# Patient Record
Sex: FEMALE | Race: BLACK OR AFRICAN AMERICAN | NOT HISPANIC OR LATINO | Employment: FULL TIME | URBAN - METROPOLITAN AREA
[De-identification: names, ages, dates, MRNs, and addresses within clinical notes are randomized per-mention and may not be internally consistent; named-entity substitution may affect disease eponyms.]

---

## 2017-01-23 ENCOUNTER — PROCEDURE VISIT (OUTPATIENT)
Dept: NEUROLOGY | Facility: CLINIC | Age: 57
End: 2017-01-23
Payer: COMMERCIAL

## 2017-01-23 DIAGNOSIS — R20.2 NUMBNESS AND TINGLING: ICD-10-CM

## 2017-01-23 DIAGNOSIS — R20.0 NUMBNESS AND TINGLING: ICD-10-CM

## 2017-01-23 DIAGNOSIS — G56.03 BILATERAL CARPAL TUNNEL SYNDROME: ICD-10-CM

## 2017-01-23 PROCEDURE — 95910 NRV CNDJ TEST 7-8 STUDIES: CPT | Mod: S$GLB,,, | Performed by: PSYCHIATRY & NEUROLOGY

## 2017-01-23 NOTE — PATIENT INSTRUCTIONS
PERIPHERAL NEUROPATHY  Peripheral Neuropathy is a condition that affects the nerves of the arms or legs. It causes a change in physical feeling. Sometimes it causes weakness in the muscles. You may feel tingling, numbness or shooting pains (especially at night). You may be sensitive to light touch or temperature changes.  Neuropathy may be caused by being exposed to certain drugs or chemicals, or because of a vitamin deficiency. It can be a complication of a chronic disease such as diabetes or alcoholism. A ruptured disk with pressure on the spinal nerve may also cause this condition.  HOME CARE:  1) You may take acetaminophen (Tylenol) or ibuprofen (Advil, Motrin) for pain, unless another pain medicine has been prescribed.  2) If the neuropathy affects your feet, keep your toenails trimmed and wash your feet often. Wear shoes that fit well. Doing so avoids pressure points, blisters and ulcers. Due to a loss of feeling, you may not notice injuries, so look at your feet carefully (including the soles of your feet and between your toes) at least once a week. Tell your doctor if you have any open wounds or signs of infection.  3) If vitamins have been prescribed, be sure to remind yourself to take them daily.  FOLLOW UP with your doctor or as advised by our staff. You may need further testing to find out the exact cause of your neuropathy.  GET PROMPT MEDICAL ATTENTION if any of the following occur:  -- Redness, swelling or pus coming from the toes or feet  -- Loss of bowel or bladder control (if this is a new symptom for you)  -- Muscle weakness (if this is a new symptom for you)  © 2781-6418 [employer], [address] [place] [address], [place] [address]. All rights reserved. This information is not intended as a substitute for professional medical care. Always follow your healthcare professional's instructions.

## 2017-03-14 ENCOUNTER — OFFICE VISIT (OUTPATIENT)
Dept: INTERNAL MEDICINE | Facility: CLINIC | Age: 57
End: 2017-03-14
Payer: COMMERCIAL

## 2017-03-14 VITALS
SYSTOLIC BLOOD PRESSURE: 120 MMHG | TEMPERATURE: 98 F | OXYGEN SATURATION: 99 % | DIASTOLIC BLOOD PRESSURE: 78 MMHG | BODY MASS INDEX: 25.47 KG/M2 | HEIGHT: 67 IN | WEIGHT: 162.25 LBS | HEART RATE: 71 BPM

## 2017-03-14 DIAGNOSIS — D70.9 NEUTROPENIA, UNSPECIFIED TYPE: ICD-10-CM

## 2017-03-14 DIAGNOSIS — Z29.9 PREVENTIVE MEASURE: ICD-10-CM

## 2017-03-14 DIAGNOSIS — R76.8 ANTINUCLEAR FACTOR POSITIVE: ICD-10-CM

## 2017-03-14 DIAGNOSIS — R22.1 NECK SWELLING: Primary | ICD-10-CM

## 2017-03-14 DIAGNOSIS — E03.9 HYPOTHYROIDISM (ACQUIRED): ICD-10-CM

## 2017-03-14 DIAGNOSIS — I10 ESSENTIAL HYPERTENSION: ICD-10-CM

## 2017-03-14 PROCEDURE — 3074F SYST BP LT 130 MM HG: CPT | Mod: S$GLB,,, | Performed by: FAMILY MEDICINE

## 2017-03-14 PROCEDURE — 1160F RVW MEDS BY RX/DR IN RCRD: CPT | Mod: S$GLB,,, | Performed by: FAMILY MEDICINE

## 2017-03-14 PROCEDURE — 99214 OFFICE O/P EST MOD 30 MIN: CPT | Mod: S$GLB,,, | Performed by: FAMILY MEDICINE

## 2017-03-14 PROCEDURE — 99999 PR PBB SHADOW E&M-EST. PATIENT-LVL III: CPT | Mod: PBBFAC,,, | Performed by: FAMILY MEDICINE

## 2017-03-14 PROCEDURE — 3078F DIAST BP <80 MM HG: CPT | Mod: S$GLB,,, | Performed by: FAMILY MEDICINE

## 2017-03-14 NOTE — PROGRESS NOTES
Subjective:       Patient ID: Gaviota Frazier is a 57 y.o. female.    Chief Complaint: Lymphadenopathy    HPI Comments: 57-year-old female patient with Patient  Active Problem List:     HTN (hypertension)     GERD (gastroesophageal reflux disease)     Hypothyroidism (acquired)     Antinuclear factor positive     Neutropenia  Concerned about swollen glands in the right side of the neck, denies of any discomfort with swallowing, fatigue chest pain or shortness of breath.  Patient has stopped taking thyroid medication for the past 1 month, previously had seen Dr. Espinoza for numbness and tingling sensation to her hands and discoloration, for which she was started on amlodipine, patient has discontinued that and was in turn refer to endocrinologist Dr. Caballero, had complete workup done and was ruled out Raynauds,  was advised to follow-up with dermatology and neurology  Patient reports that the discoloration of hands has subsided and tingling and numbness has subsided after stopping thyroid medication.   Patient after seen neurologist had EMG studies showing bilateral sensory neuropathy, was advised to repeat testing in 6 months if symptoms continue to persist.   Patient had positive CHUYITA levels for which she's been refer to rheumatology.  Patient reports that she thinks her thyroid medication has caused all her symptoms, and since stopping her medication she's been feeling better.  Patient continues to take carvedilol for blood pressure  Reports noticing swollen glands for the past 1 month.  Denies of any fever              Review of Systems   Constitutional: Negative for fatigue and fever.   HENT: Negative for postnasal drip and trouble swallowing.    Eyes: Negative for visual disturbance.   Respiratory: Negative for shortness of breath.    Cardiovascular: Negative for chest pain.   Gastrointestinal: Negative for abdominal pain, nausea and vomiting.   Musculoskeletal: Negative for arthralgias and myalgias.   Skin:  "Negative for rash.   Neurological: Negative for weakness, light-headedness, numbness and headaches.   Psychiatric/Behavioral: Negative for sleep disturbance.         /78  Pulse 71  Temp 98 °F (36.7 °C) (Tympanic)   Ht 5' 7" (1.702 m)  Wt 73.6 kg (162 lb 4.1 oz)  SpO2 99%  BMI 25.41 kg/m2  Objective:      Physical Exam   Constitutional: She is oriented to person, place, and time. She appears well-developed and well-nourished.   HENT:   Head: Normocephalic and atraumatic.   Right Ear: External ear normal.   Left Ear: External ear normal.   Mouth/Throat: Oropharynx is clear and moist.   Neck: Neck supple. Thyromegaly present.   Cardiovascular: Normal rate, regular rhythm and normal heart sounds.    Pulmonary/Chest: Effort normal and breath sounds normal. She has no wheezes.   Abdominal: Soft. Bowel sounds are normal. There is no tenderness.   Lymphadenopathy:     She has cervical adenopathy.   Neurological: She is alert and oriented to person, place, and time.   Skin: Skin is warm and dry. No rash noted.   Psychiatric: She has a normal mood and affect.         Assessment:       1. Neck swelling    2. Preventive measure    3. Hypothyroidism (acquired)    4. Essential hypertension    5. Antinuclear factor positive    6. Neutropenia, unspecified type        Plan:   Neck swelling  -     US Soft Tissue Head Neck Thyroid; Future; Expected date: 3/14/17  Will check ultrasound, as previous ultrasound showing thyroid nodules.     Will continue to monitor, advised to take over-the-counter Tylenol/ibuprofen as needed, if having any pain.   Follow-up in 2 weeks    Preventive measure  -     CBC auto differential; Future; Expected date: 3/14/17  -     Comprehensive metabolic panel; Future; Expected date: 3/14/17  -     Lipid panel; Future; Expected date: 3/14/17  -     TSH; Future; Expected date: 3/14/17  Will check further labs and follow up in 2 weeks    Hypothyroidism (acquired)  -     TSH; Future; Expected date: " 3/14/17  -     T4; Future; Expected date: 3/14/17  -     T3; Future; Expected date: 3/14/17  -     US Soft Tissue Head Neck Thyroid; Future; Expected date: 3/14/17  Currently not taking levothyroxine 100 µg by mouth daily    Essential hypertension  -     Comprehensive metabolic panel; Future; Expected date: 3/14/17  -     Lipid panel; Future; Expected date: 3/14/17  Blood pressure stable today currently on carvedilol 12.5 mg twice daily    Antinuclear factor positive- followed by rheumatology Dr. Federico Rankin, unspecified type  -     CBC auto differential; Future; Expected date: 3/14/17  Will recheck labs today

## 2017-03-14 NOTE — MR AVS SNAPSHOT
[facility]  [address] [place] [address]  Phone: [phone]  Fax: [phone]                  Gaviota Frazier   3/14/2017 4:00 PM   Office Visit    Description:  Female : 1960   Provider:  Princess Malave MD   Department:  [facility]           Reason for Visit     Lymphadenopathy           Diagnoses this Visit        Comments    Neck swelling    -  Primary     Preventive measure         Hypothyroidism (acquired)         Essential hypertension         Antinuclear factor positive         Neutropenia, unspecified type                To Do List           Future Appointments        Provider Department Dept Phone    3/17/2017 10:25 AM LAB[facility]AT[facility], [facility] [phone]    3/17/2017 11:15 AM [facility] [phone]    3/28/2017 9:00 AM Princess Malave MD [facility] [phone]      Goals (5 Years of Data)     None      Follow-Up and Disposition     Return in about 2 weeks (around 3/28/2017), or if symptoms worsen or fail to improve.      [facility]     [facility] Care [facility] -  Assistance  Registered nurses in the [facility] provide clinical advisement, health education, appointment booking, and other advisory services.  Call for this free service at [phone].             Medications           Message regarding Medications     Verify the changes and/or additions to your medication regime listed below are the same as discussed with your clinician today.  If any of these changes or additions are incorrect, please notify your healthcare provider.        STOP taking these medications     betamethasone dipropionate (DIPROLENE) 0.05 % ointment Apply topically 2 (two) times daily. Use with cotton gloves at bedtime    amlodipine (NORVASC) 5 MG tablet Take 1 tablet (5 mg total) by mouth once daily.    ergocalciferol (ERGOCALCIFEROL) 50,000 unit Cap Take 1 capsule (50,000  "Units total) by mouth every 7 days.    estradiol (ESTRACE) 2 MG tablet Take 2 mg by mouth once daily.           Verify that the below list of medications is an accurate representation of the medications you are currently taking.  If none reported, the list may be blank. If incorrect, please contact your healthcare provider. Carry this list with you in case of emergency.           Current Medications     carvedilol (COREG) 12.5 MG tablet Take 1 tablet (12.5 mg total) by mouth 2 (two) times daily with meals.    levothyroxine (SYNTHROID) 100 MCG tablet Take 1 tablet (100 mcg total) by mouth once daily.           Clinical Reference Information           Your Vitals Were     BP Pulse Temp Height Weight SpO2    120/78 71 98 °F (36.7 °C) (Tympanic) 5' 7" (1.702 m) 73.6 kg (162 lb 4.1 oz) 99%    BMI                25.41 kg/m2          Blood Pressure          Most Recent Value    BP  120/78      Allergies as of 3/14/2017     Beni    Unable To Assess      Immunizations Administered on Date of Encounter - 3/14/2017     None      Orders Placed During Today's Visit     Future Labs/Procedures Expected by Expires    CBC auto differential  3/14/2017 5/13/2018    Comprehensive metabolic panel  3/14/2017 5/13/2018    Lipid panel  3/14/2017 5/13/2018    T3  3/14/2017 5/13/2018    T4  3/14/2017 5/13/2018    TSH  3/14/2017 5/13/2018    US Soft Tissue Head Neck Thyroid  3/14/2017 3/14/2018      Language Assistance Services     ATTENTION: Language assistance services are available, free of charge. Please call [phone].      ATENCIÓN: Si habla español, tiene a mcdermott disposición servicios gratuitos de asistencia lingüística. Llame al [phone].     JOVANA Ý: N?u b?n nói Ti?ng Vi?t, có các d?ch v? h? tr? ngôn ng? mi?n phí dành cho b?n. G?i s? [phone].         [facility] complies with applicable Federal civil rights laws and does not discriminate on the basis of race, color, national origin, age, disability, or " sex.

## 2017-03-16 ENCOUNTER — TELEPHONE (OUTPATIENT)
Dept: RADIOLOGY | Facility: HOSPITAL | Age: 57
End: 2017-03-16

## 2017-03-17 ENCOUNTER — HOSPITAL ENCOUNTER (OUTPATIENT)
Dept: RADIOLOGY | Facility: HOSPITAL | Age: 57
Discharge: HOME | End: 2017-03-17
Attending: FAMILY MEDICINE
Payer: COMMERCIAL

## 2017-03-17 DIAGNOSIS — R22.1 NECK SWELLING: ICD-10-CM

## 2017-03-17 DIAGNOSIS — E03.9 HYPOTHYROIDISM (ACQUIRED): ICD-10-CM

## 2017-03-17 PROCEDURE — 76536 US EXAM OF HEAD AND NECK: CPT | Mod: 26,,, | Performed by: RADIOLOGY

## 2017-03-17 PROCEDURE — 76536 US EXAM OF HEAD AND NECK: CPT | Mod: TC,PO

## 2017-03-20 ENCOUNTER — TELEPHONE (OUTPATIENT)
Dept: INTERNAL MEDICINE | Facility: CLINIC | Age: 57
End: 2017-03-20

## 2017-03-20 DIAGNOSIS — E03.9 HYPOTHYROIDISM, UNSPECIFIED TYPE: ICD-10-CM

## 2017-03-20 DIAGNOSIS — E78.5 HYPERLIPIDEMIA, UNSPECIFIED HYPERLIPIDEMIA TYPE: Primary | ICD-10-CM

## 2017-03-20 RX ORDER — LEVOTHYROXINE SODIUM 100 UG/1
100 TABLET ORAL DAILY
Qty: 30 TABLET | Refills: 1 | Status: SHIPPED | OUTPATIENT
Start: 2017-03-20 | End: 2018-07-30 | Stop reason: SDUPTHER

## 2017-03-20 NOTE — TELEPHONE ENCOUNTER
----- Message from Yusef Best sent at 3/20/2017  4:19 PM CDT -----  Contact: [phone]   Returning call, please call back @ [phone]. Thanks-[facility]

## 2017-03-20 NOTE — TELEPHONE ENCOUNTER
Patient to start back taking levothyroxine 100 µg by mouth daily, new to severe hypothyroidism for not taking medications for the past 1 month.  Extremely elevated cholesterol levels noted likely nonfasting sample, will plan to repeat fasting sample on the day of the visit on 3/28/17.  Will plan to repeat thyroid labs in 1 month.

## 2017-03-31 ENCOUNTER — TELEPHONE (OUTPATIENT)
Dept: INTERNAL MEDICINE | Facility: CLINIC | Age: 57
End: 2017-03-31

## 2017-03-31 NOTE — TELEPHONE ENCOUNTER
Pt states that she have her fingers are gray at the tip and numb. Pt states that her fingers feel likes it burnt. Pt is requesting another medication other than Levothyroxine. Pt states that she wants to know why her fingers are turning gray and numb. Pt states that the medication is causing these complications and would like for it to be changed to a different medication. Please advise.

## 2017-03-31 NOTE — TELEPHONE ENCOUNTER
----- Message from Tate Jolley sent at 3/31/2017  8:31 AM CDT -----  Contact: Patient  Pt needs a return call @ ..[phone] (home) Thank you/[facility]

## 2017-03-31 NOTE — TELEPHONE ENCOUNTER
Patient informed that she's been evaluated for Raynaud's disease, which was normal by Dr. Caballero.   I do not think there is an association with thyroid medication, causing discoloration to her fingers, I would highly recommend patient to see endocrinology, to discuss about further thyroid treatments as she if she does not want to continue levothyroxin, her thyroid levels have been extremely abnormal, for which she needs to be treated.  Patient should get further evaluated for preoperative discoloration of her fingers, if she's interested will refer to vascular specialist for complete evaluation .

## 2017-04-26 ENCOUNTER — TELEPHONE (OUTPATIENT)
Dept: ENDOCRINOLOGY | Facility: CLINIC | Age: 57
End: 2017-04-26

## 2017-04-26 DIAGNOSIS — E03.9 HYPOTHYROIDISM, UNSPECIFIED TYPE: Primary | ICD-10-CM

## 2017-04-26 NOTE — TELEPHONE ENCOUNTER
----- Message from Denny Arita sent at 4/25/2017  3:35 PM CDT -----  Contact: Patient  Patient is requesting to complete labs at [facility] to 8/31/2017 appointment.    Please call [phone].

## 2017-04-26 NOTE — TELEPHONE ENCOUNTER
Labs scheduled Aug 27th at [facility].  Patient also wants sooner visit and was put on our cancellation list

## 2017-07-03 ENCOUNTER — OFFICE VISIT (OUTPATIENT)
Dept: OTOLARYNGOLOGY | Facility: CLINIC | Age: 57
End: 2017-07-03
Payer: COMMERCIAL

## 2017-07-03 ENCOUNTER — LAB VISIT (OUTPATIENT)
Dept: LAB | Facility: HOSPITAL | Age: 57
End: 2017-07-03
Attending: OTOLARYNGOLOGY
Payer: COMMERCIAL

## 2017-07-03 VITALS
DIASTOLIC BLOOD PRESSURE: 77 MMHG | SYSTOLIC BLOOD PRESSURE: 123 MMHG | HEART RATE: 55 BPM | WEIGHT: 158.75 LBS | BODY MASS INDEX: 24.86 KG/M2 | TEMPERATURE: 98 F

## 2017-07-03 DIAGNOSIS — H93.292 ABNORMAL AUDITORY PERCEPTION OF LEFT EAR: ICD-10-CM

## 2017-07-03 DIAGNOSIS — R22.0 SWELLING, MASS, OR LUMP IN HEAD AND NECK: ICD-10-CM

## 2017-07-03 DIAGNOSIS — R22.0 SWELLING, MASS, OR LUMP IN HEAD AND NECK: Primary | ICD-10-CM

## 2017-07-03 DIAGNOSIS — R22.1 SWELLING, MASS, OR LUMP IN HEAD AND NECK: Primary | ICD-10-CM

## 2017-07-03 DIAGNOSIS — R22.1 SWELLING, MASS, OR LUMP IN HEAD AND NECK: ICD-10-CM

## 2017-07-03 LAB
CREAT SERPL-MCNC: 0.9 MG/DL
EST. GFR  (AFRICAN AMERICAN): >60 ML/MIN/1.73 M^2
EST. GFR  (NON AFRICAN AMERICAN): >60 ML/MIN/1.73 M^2

## 2017-07-03 PROCEDURE — 99999 PR PBB SHADOW E&M-EST. PATIENT-LVL III: CPT | Mod: PBBFAC,,, | Performed by: OTOLARYNGOLOGY

## 2017-07-03 PROCEDURE — 36415 COLL VENOUS BLD VENIPUNCTURE: CPT | Mod: PO

## 2017-07-03 PROCEDURE — 99213 OFFICE O/P EST LOW 20 MIN: CPT | Mod: S$GLB,,, | Performed by: OTOLARYNGOLOGY

## 2017-07-03 PROCEDURE — 82565 ASSAY OF CREATININE: CPT | Mod: PO

## 2017-07-03 RX ORDER — ESTRADIOL 2 MG/1
TABLET ORAL
Refills: 3 | COMMUNITY
Start: 2017-04-27 | End: 2017-12-18
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
## 2017-07-05 ENCOUNTER — TELEPHONE (OUTPATIENT)
Dept: RADIOLOGY | Facility: HOSPITAL | Age: 57
End: 2017-07-05

## 2017-07-06 ENCOUNTER — HOSPITAL ENCOUNTER (OUTPATIENT)
Dept: RADIOLOGY | Facility: HOSPITAL | Age: 57
Discharge: HOME | End: 2017-07-06
Attending: OTOLARYNGOLOGY
Payer: COMMERCIAL

## 2017-07-06 DIAGNOSIS — R22.1 SWELLING, MASS, OR LUMP IN HEAD AND NECK: ICD-10-CM

## 2017-07-06 DIAGNOSIS — R22.0 SWELLING, MASS, OR LUMP IN HEAD AND NECK: ICD-10-CM

## 2017-07-06 PROCEDURE — 25500020 PHARM REV CODE 255: Mod: PO | Performed by: OTOLARYNGOLOGY

## 2017-07-06 PROCEDURE — 70491 CT SOFT TISSUE NECK W/DYE: CPT | Mod: 26,,, | Performed by: RADIOLOGY

## 2017-07-06 PROCEDURE — 70491 CT SOFT TISSUE NECK W/DYE: CPT | Mod: TC,PO

## 2017-07-06 RX ADMIN — IOHEXOL 75 ML: 350 INJECTION, SOLUTION INTRAVENOUS at 08:07

## 2017-07-07 ENCOUNTER — TELEPHONE (OUTPATIENT)
Dept: OTOLARYNGOLOGY | Facility: CLINIC | Age: 57
End: 2017-07-07

## 2017-07-07 NOTE — TELEPHONE ENCOUNTER
Conveyed result note to patient with verbal understanding. Patient wishes to know how would we determine rather or not the swelling would be caused from the mumps and what do we do next? She states that it is still swollen and she feels that she is getting frustrated and feels like she is at a dead end road as everyone she has went to and being referred to is saying that everything is normal. She still has the foul taste in her mouth with no changes since her visit. Please advise.

## 2017-07-07 NOTE — TELEPHONE ENCOUNTER
----- Message from Armen Chavez MD sent at 7/6/2017 11:28 PM CDT -----  Please call patient with normal results.

## 2017-07-11 ENCOUNTER — TELEPHONE (OUTPATIENT)
Dept: OTOLARYNGOLOGY | Facility: CLINIC | Age: 57
End: 2017-07-11

## 2017-07-11 NOTE — TELEPHONE ENCOUNTER
Spoke with patient to discuss ct report. Patient states that she figured out how to view it on [facility] and will let her chiropractor view it that way. Instructed her how to obtain a disc if needed also with verbal understanding.

## 2017-09-09 DIAGNOSIS — E55.9 VITAMIN D INSUFFICIENCY: ICD-10-CM

## 2017-09-11 RX ORDER — ERGOCALCIFEROL 1.25 MG/1
CAPSULE ORAL
Qty: 12 CAPSULE | Refills: 0 | Status: SHIPPED | OUTPATIENT
Start: 2017-09-11 | End: 2017-12-18

## 2017-09-11 NOTE — TELEPHONE ENCOUNTER
Approved but patient needs a follow up Appointment and Labs with   Dr Malave  Please call and schedule.

## 2017-12-18 ENCOUNTER — HOSPITAL ENCOUNTER (OUTPATIENT)
Dept: RADIOLOGY | Facility: HOSPITAL | Age: 57
Discharge: HOME | End: 2017-12-18
Attending: FAMILY MEDICINE
Payer: COMMERCIAL

## 2017-12-18 ENCOUNTER — OFFICE VISIT (OUTPATIENT)
Dept: INTERNAL MEDICINE | Facility: CLINIC | Age: 57
End: 2017-12-18
Payer: COMMERCIAL

## 2017-12-18 VITALS
OXYGEN SATURATION: 99 % | HEART RATE: 85 BPM | BODY MASS INDEX: 25.67 KG/M2 | SYSTOLIC BLOOD PRESSURE: 110 MMHG | TEMPERATURE: 97 F | DIASTOLIC BLOOD PRESSURE: 70 MMHG | WEIGHT: 163.56 LBS | HEIGHT: 67 IN

## 2017-12-18 DIAGNOSIS — I10 ESSENTIAL HYPERTENSION: ICD-10-CM

## 2017-12-18 DIAGNOSIS — E89.0 POSTOPERATIVE HYPOTHYROIDISM: ICD-10-CM

## 2017-12-18 DIAGNOSIS — R01.1 MURMUR: ICD-10-CM

## 2017-12-18 DIAGNOSIS — Z00.00 ROUTINE GENERAL MEDICAL EXAMINATION AT A HEALTH CARE FACILITY: Primary | ICD-10-CM

## 2017-12-18 DIAGNOSIS — K21.9 GASTROESOPHAGEAL REFLUX DISEASE, ESOPHAGITIS PRESENCE NOT SPECIFIED: ICD-10-CM

## 2017-12-18 DIAGNOSIS — R76.11 PPD POSITIVE: ICD-10-CM

## 2017-12-18 DIAGNOSIS — Z12.11 COLON CANCER SCREENING: ICD-10-CM

## 2017-12-18 DIAGNOSIS — J30.1 CHRONIC SEASONAL ALLERGIC RHINITIS DUE TO POLLEN: ICD-10-CM

## 2017-12-18 PROCEDURE — 71020 XR CHEST PA AND LATERAL: CPT | Mod: TC,PO

## 2017-12-18 PROCEDURE — 99396 PREV VISIT EST AGE 40-64: CPT | Mod: 25,S$GLB,, | Performed by: FAMILY MEDICINE

## 2017-12-18 PROCEDURE — 90471 IMMUNIZATION ADMIN: CPT | Mod: S$GLB,,, | Performed by: FAMILY MEDICINE

## 2017-12-18 PROCEDURE — 71020 XR CHEST PA AND LATERAL: CPT | Mod: 26,,, | Performed by: RADIOLOGY

## 2017-12-18 PROCEDURE — 90686 IIV4 VACC NO PRSV 0.5 ML IM: CPT | Mod: S$GLB,,, | Performed by: FAMILY MEDICINE

## 2017-12-18 PROCEDURE — 99999 PR PBB SHADOW E&M-EST. PATIENT-LVL IV: CPT | Mod: PBBFAC,,, | Performed by: FAMILY MEDICINE

## 2017-12-18 RX ORDER — MONTELUKAST SODIUM 10 MG/1
10 TABLET ORAL NIGHTLY
Qty: 30 TABLET | Refills: 0 | Status: SHIPPED | OUTPATIENT
Start: 2017-12-18 | End: 2018-01-17

## 2017-12-18 RX ORDER — SODIUM, POTASSIUM,MAG SULFATES 17.5-3.13G
SOLUTION, RECONSTITUTED, ORAL ORAL
Qty: 1 BOTTLE | Refills: 0 | Status: ON HOLD | OUTPATIENT
Start: 2017-12-18 | End: 2017-12-27 | Stop reason: HOSPADM

## 2017-12-18 RX ORDER — FLUTICASONE PROPIONATE 50 MCG
1 SPRAY, SUSPENSION (ML) NASAL DAILY
Qty: 16 G | Refills: 0 | Status: SHIPPED | OUTPATIENT
Start: 2017-12-18 | End: 2018-12-21

## 2017-12-18 NOTE — PROGRESS NOTES
"Subjective:       Patient ID: Gaviota Frazier is a 57 y.o. female.    Chief Complaint: Annual Exam    57-year-old -American female patient with Patient Active Problem List:     HTN (hypertension)     GERD (gastroesophageal reflux disease)     Hypothyroidism (acquired)     Antinuclear factor positive     Neutropenia  Here for routine annual physicals  Patient reports that she has been taking her medications regularly, denies of any palpitations.  Patient has decreased taking carvedilol 12.5 mg from twice a day to once daily, as she was having low blood pressures been taking twice daily.  Has been taking her thyroid medication regularly  Patient was previously seen by Dr. Cutler, endocrinology  Denies of any extreme fatigue, changes in bowel movements or appetite.   Acid reflex has been stable  Patient reports that she's been having runny nose and cold-like symptoms for which she has been using Afrin nasal spray.         Review of Systems   Constitutional: Negative for fatigue and fever.   HENT: Positive for congestion, rhinorrhea and sneezing.    Eyes: Negative for visual disturbance.   Respiratory: Negative for shortness of breath.    Cardiovascular: Negative for chest pain, palpitations and leg swelling.   Gastrointestinal: Negative for abdominal pain, constipation, nausea and vomiting.   Musculoskeletal: Negative for myalgias.   Skin: Negative for rash.   Neurological: Negative for light-headedness and headaches.   Psychiatric/Behavioral: Negative for sleep disturbance.         /70   Pulse 85   Temp 97.4 °F (36.3 °C) (Tympanic)   Ht 5' 7" (1.702 m)   Wt 74.2 kg (163 lb 9.3 oz)   SpO2 99%   BMI 25.62 kg/m²   Objective:      Physical Exam   Constitutional: She is oriented to person, place, and time. She appears well-developed and well-nourished.   HENT:   Head: Normocephalic and atraumatic.   Mouth/Throat: Oropharynx is clear and moist. No oropharyngeal exudate.   Positive for postnasal drip " and rhinorrhea noted   Cardiovascular: Normal rate and regular rhythm.    Murmur heard.  Pulmonary/Chest: Effort normal and breath sounds normal. She has no wheezes.   Abdominal: Soft. Bowel sounds are normal. There is no tenderness.   Musculoskeletal: She exhibits no edema.   Neurological: She is alert and oriented to person, place, and time.   Skin: Skin is warm and dry. No rash noted.   Psychiatric: She has a normal mood and affect.         Assessment:       1. Routine general medical examination at a health care facility    2. Essential hypertension    3. Postoperative hypothyroidism    4. Gastroesophageal reflux disease, esophagitis presence not specified    5. Chronic seasonal allergic rhinitis due to pollen    6. PPD positive    7. Colon cancer screening    8. Murmur        Plan:   Routine general medical examination at a health care facility  -     CBC auto differential; Future; Expected date: 12/18/2017  -     Comprehensive metabolic panel; Future; Expected date: 12/18/2017  -     Lipid panel; Future; Expected date: 12/18/2017  -     TSH; Future; Expected date: 12/18/2017  Vital signs stable today.  Clinical exam stable.   Encouraged to start lifestyle modifications with low-fat and low-cholesterol diet and exercise 30 minutes daily  Flu shot given today    Essential hypertension  -     Comprehensive metabolic panel; Future; Expected date: 12/18/2017  -     Lipid panel; Future; Expected date: 12/18/2017  -     TSH; Future; Expected date: 12/18/2017  -     Urinalysis; Future; Expected date: 12/18/2017  -     EKG 12-lead; Future  Blood pressure stable today currently on carvedilol 12.5 mg once daily  Secondary to faint murmur will get EKG today, patient is aware of  her murmur    Postoperative hypothyroidism  -     TSH; Future; Expected date: 12/18/2017  -     T3; Future; Expected date: 12/18/2017  -     T4; Future; Expected date: 12/18/2017  -     Ambulatory referral to Endocrinology  Currently taking  levothyroxine 100 µg by mouth daily    Gastroesophageal reflux disease, esophagitis presence not specified-stable with diet changes  Continue eating small frequent meals    Chronic seasonal allergic rhinitis due to pollen  -     montelukast (SINGULAIR) 10 mg tablet; Take 1 tablet (10 mg total) by mouth every evening.  Dispense: 30 tablet; Refill: 0  -     fluticasone (FLONASE) 50 mcg/actuation nasal spray; 1 spray by Each Nare route once daily.  Dispense: 16 g; Refill: 0  Patient was advised to avoid using Afrin nasal spray and start Singulair and Flonase.  Drink adequate fluids      PPD positive  -     X-Ray Chest PA And Lateral; Future; Expected date: 12/18/2017  Would like to get chest x-ray as she was PPD positive in the past and was never treated.     Colon cancer screening  -     Case request GI: COLONOSCOPY  -     sodium,potassium,mag sulfates (SUPREP BOWEL PREP KIT) 17.5-3.13-1.6 gram SolR; Take it as directed  Dispense: 1 Bottle; Refill: 0  Due for colonoscopy    Murmur  -     EKG 12-lead; Future    Other orders  -     Influenza - Quadrivalent (3 years & older) (PF)  Flu shot given today

## 2017-12-19 ENCOUNTER — CLINICAL SUPPORT (OUTPATIENT)
Dept: CARDIOLOGY | Facility: CLINIC | Age: 57
End: 2017-12-19
Payer: COMMERCIAL

## 2017-12-19 DIAGNOSIS — R01.1 MURMUR: ICD-10-CM

## 2017-12-19 DIAGNOSIS — I10 ESSENTIAL HYPERTENSION: ICD-10-CM

## 2017-12-19 PROCEDURE — 93000 ELECTROCARDIOGRAM COMPLETE: CPT | Mod: S$GLB,,, | Performed by: INTERNAL MEDICINE

## 2017-12-21 ENCOUNTER — LAB VISIT (OUTPATIENT)
Dept: LAB | Facility: HOSPITAL | Age: 57
End: 2017-12-21
Attending: FAMILY MEDICINE
Payer: COMMERCIAL

## 2017-12-21 DIAGNOSIS — E89.0 POSTOPERATIVE HYPOTHYROIDISM: ICD-10-CM

## 2017-12-21 DIAGNOSIS — E78.2 MIXED HYPERLIPIDEMIA: Primary | ICD-10-CM

## 2017-12-21 DIAGNOSIS — I10 ESSENTIAL HYPERTENSION: ICD-10-CM

## 2017-12-21 DIAGNOSIS — Z00.00 ROUTINE GENERAL MEDICAL EXAMINATION AT A HEALTH CARE FACILITY: ICD-10-CM

## 2017-12-21 LAB
ALBUMIN SERPL BCP-MCNC: 3.4 G/DL
ALP SERPL-CCNC: 58 U/L
ALT SERPL W/O P-5'-P-CCNC: 13 U/L
ANION GAP SERPL CALC-SCNC: 6 MMOL/L
AST SERPL-CCNC: 18 U/L
BASOPHILS # BLD AUTO: 0.02 K/UL
BASOPHILS NFR BLD: 0.7 %
BILIRUB SERPL-MCNC: 0.3 MG/DL
BUN SERPL-MCNC: 9 MG/DL
CALCIUM SERPL-MCNC: 9 MG/DL
CHLORIDE SERPL-SCNC: 105 MMOL/L
CHOLEST SERPL-MCNC: 218 MG/DL
CHOLEST/HDLC SERPL: 3.3 {RATIO}
CO2 SERPL-SCNC: 31 MMOL/L
CREAT SERPL-MCNC: 0.9 MG/DL
DIFFERENTIAL METHOD: ABNORMAL
EOSINOPHIL # BLD AUTO: 0.1 K/UL
EOSINOPHIL NFR BLD: 2.4 %
ERYTHROCYTE [DISTWIDTH] IN BLOOD BY AUTOMATED COUNT: 12.3 %
EST. GFR  (AFRICAN AMERICAN): >60 ML/MIN/1.73 M^2
EST. GFR  (NON AFRICAN AMERICAN): >60 ML/MIN/1.73 M^2
GLUCOSE SERPL-MCNC: 92 MG/DL
HCT VFR BLD AUTO: 39.4 %
HDLC SERPL-MCNC: 67 MG/DL
HDLC SERPL: 30.7 %
HGB BLD-MCNC: 12.3 G/DL
IMM GRANULOCYTES # BLD AUTO: 0 K/UL
IMM GRANULOCYTES NFR BLD AUTO: 0 %
LDLC SERPL CALC-MCNC: 133.2 MG/DL
LYMPHOCYTES # BLD AUTO: 1.6 K/UL
LYMPHOCYTES NFR BLD: 55.1 %
MCH RBC QN AUTO: 29.4 PG
MCHC RBC AUTO-ENTMCNC: 31.2 G/DL
MCV RBC AUTO: 94 FL
MONOCYTES # BLD AUTO: 0.3 K/UL
MONOCYTES NFR BLD: 10.3 %
NEUTROPHILS # BLD AUTO: 0.9 K/UL
NEUTROPHILS NFR BLD: 31.5 %
NONHDLC SERPL-MCNC: 151 MG/DL
NRBC BLD-RTO: 0 /100 WBC
PLATELET # BLD AUTO: 242 K/UL
PMV BLD AUTO: 11 FL
POTASSIUM SERPL-SCNC: 3.9 MMOL/L
PROT SERPL-MCNC: 7.4 G/DL
RBC # BLD AUTO: 4.19 M/UL
SODIUM SERPL-SCNC: 142 MMOL/L
T3 SERPL-MCNC: 98 NG/DL
T4 FREE SERPL-MCNC: 1.03 NG/DL
T4 SERPL-MCNC: 7.9 UG/DL
TRIGL SERPL-MCNC: 89 MG/DL
TSH SERPL DL<=0.005 MIU/L-ACNC: 4.61 UIU/ML
WBC # BLD AUTO: 2.92 K/UL

## 2017-12-21 PROCEDURE — 80053 COMPREHEN METABOLIC PANEL: CPT

## 2017-12-21 PROCEDURE — 84439 ASSAY OF FREE THYROXINE: CPT

## 2017-12-21 PROCEDURE — 84443 ASSAY THYROID STIM HORMONE: CPT

## 2017-12-21 PROCEDURE — 36415 COLL VENOUS BLD VENIPUNCTURE: CPT | Mod: PO

## 2017-12-21 PROCEDURE — 80061 LIPID PANEL: CPT

## 2017-12-21 PROCEDURE — 85025 COMPLETE CBC W/AUTO DIFF WBC: CPT

## 2017-12-21 PROCEDURE — 84480 ASSAY TRIIODOTHYRONINE (T3): CPT

## 2017-12-21 PROCEDURE — 84436 ASSAY OF TOTAL THYROXINE: CPT

## 2017-12-21 RX ORDER — SIMVASTATIN 20 MG/1
20 TABLET, FILM COATED ORAL NIGHTLY
Qty: 90 TABLET | Refills: 3 | Status: SHIPPED | OUTPATIENT
Start: 2017-12-21 | End: 2019-03-19 | Stop reason: SDUPTHER

## 2017-12-22 DIAGNOSIS — I10 ESSENTIAL HYPERTENSION: ICD-10-CM

## 2017-12-26 RX ORDER — CARVEDILOL 12.5 MG/1
12.5 TABLET ORAL 2 TIMES DAILY WITH MEALS
Qty: 60 TABLET | Refills: 4 | Status: SHIPPED | OUTPATIENT
Start: 2017-12-26 | End: 2018-11-28 | Stop reason: SDUPTHER

## 2017-12-27 ENCOUNTER — ANESTHESIA EVENT (OUTPATIENT)
Dept: ENDOSCOPY | Facility: HOSPITAL | Age: 57
End: 2017-12-27
Payer: COMMERCIAL

## 2017-12-27 ENCOUNTER — SURGERY (OUTPATIENT)
Age: 57
End: 2017-12-27

## 2017-12-27 ENCOUNTER — HOSPITAL ENCOUNTER (OUTPATIENT)
Facility: HOSPITAL | Age: 57
Discharge: HOME | End: 2017-12-27
Attending: SURGERY | Admitting: SURGERY
Payer: COMMERCIAL

## 2017-12-27 ENCOUNTER — ANESTHESIA (OUTPATIENT)
Dept: ENDOSCOPY | Facility: HOSPITAL | Age: 57
End: 2017-12-27
Payer: COMMERCIAL

## 2017-12-27 DIAGNOSIS — Z12.11 SPECIAL SCREENING FOR MALIGNANT NEOPLASMS, COLON: ICD-10-CM

## 2017-12-27 DIAGNOSIS — Z12.11 ENCOUNTER FOR SCREENING COLONOSCOPY: Primary | ICD-10-CM

## 2017-12-27 PROCEDURE — 37000008 HC ANESTHESIA 1ST 15 MINUTES: Performed by: SURGERY

## 2017-12-27 PROCEDURE — 25000003 PHARM REV CODE 250: Performed by: SURGERY

## 2017-12-27 PROCEDURE — 25000003 PHARM REV CODE 250: Performed by: NURSE ANESTHETIST, CERTIFIED REGISTERED

## 2017-12-27 PROCEDURE — 63600175 PHARM REV CODE 636 W HCPCS: Performed by: NURSE ANESTHETIST, CERTIFIED REGISTERED

## 2017-12-27 PROCEDURE — G0121 COLON CA SCRN NOT HI RSK IND: HCPCS | Mod: ,,, | Performed by: SURGERY

## 2017-12-27 PROCEDURE — G0121 COLON CA SCRN NOT HI RSK IND: HCPCS | Performed by: SURGERY

## 2017-12-27 PROCEDURE — 37000009 HC ANESTHESIA EA ADD 15 MINS: Performed by: SURGERY

## 2017-12-27 RX ORDER — SODIUM CHLORIDE, SODIUM LACTATE, POTASSIUM CHLORIDE, CALCIUM CHLORIDE 600; 310; 30; 20 MG/100ML; MG/100ML; MG/100ML; MG/100ML
INJECTION, SOLUTION INTRAVENOUS CONTINUOUS
Status: DISCONTINUED | OUTPATIENT
Start: 2017-12-27 | End: 2017-12-27 | Stop reason: HOSPADM

## 2017-12-27 RX ORDER — LIDOCAINE HYDROCHLORIDE 10 MG/ML
INJECTION INFILTRATION; PERINEURAL
Status: DISCONTINUED | OUTPATIENT
Start: 2017-12-27 | End: 2017-12-27

## 2017-12-27 RX ORDER — PROPOFOL 10 MG/ML
VIAL (ML) INTRAVENOUS
Status: DISCONTINUED | OUTPATIENT
Start: 2017-12-27 | End: 2017-12-27

## 2017-12-27 RX ADMIN — PROPOFOL 50 MG: 10 INJECTION, EMULSION INTRAVENOUS at 07:12

## 2017-12-27 RX ADMIN — SODIUM CHLORIDE, SODIUM LACTATE, POTASSIUM CHLORIDE, AND CALCIUM CHLORIDE: .6; .31; .03; .02 INJECTION, SOLUTION INTRAVENOUS at 06:12

## 2017-12-27 RX ADMIN — PROPOFOL 100 MG: 10 INJECTION, EMULSION INTRAVENOUS at 07:12

## 2017-12-27 RX ADMIN — LIDOCAINE HYDROCHLORIDE 50 MG: 10 INJECTION, SOLUTION INFILTRATION; PERINEURAL at 07:12

## 2017-12-27 NOTE — DISCHARGE SUMMARY
[facility]  Short Stay  Discharge Summary    Admit Date: 12/27/2017    Discharge Date and Time: 12/27/2017      Discharge Attending Physician: Francisco Juarez MD     Reason for Admission: Screening colonoscopy    Hospital Course (synopsis of major diagnoses, care, treatment, and services provided during the course of the hospital stay): Uneventful and full recovery    Final Diagnoses:    Principal Problem: Special screening for malignant neoplasms, colon   Secondary Diagnoses: Special screening for malignant neoplasms, colon    Procedures Performed: Procedure(s) (LRB):  COLONOSCOPY (N/A)      Discharged Condition: good    Disposition: Home or Self Care       Discharge Condition: Stable    Follow up/Patient Instructions:  Follow-up Information     Princess Malave MD.    Specialty:  Family Medicine  Why:  As needed  Contact information:  [address]  [place] [address] [phone]                   Medications:      Medication List      CONTINUE taking these medications    carvedilol 12.5 MG tablet  Commonly known as:  COREG  TAKE 1 TABLET (12.5 MG TOTAL) BY MOUTH 2 (TWO) TIMES DAILY WITH MEALS.     fluticasone 50 mcg/actuation nasal spray  Commonly known as:  FLONASE  1 spray by Each Nare route once daily.     levothyroxine 100 MCG tablet  Commonly known as:  SYNTHROID  Take 1 tablet (100 mcg total) by mouth once daily.     montelukast 10 mg tablet  Commonly known as:  SINGULAIR  Take 1 tablet (10 mg total) by mouth every evening.     simvastatin 20 MG tablet  Commonly known as:  ZOCOR  Take 1 tablet (20 mg total) by mouth every evening.        STOP taking these medications    sodium,potassium,mag sulfates 17.5-3.13-1.6 gram Solr  Commonly known as:  SUPREP BOWEL PREP KIT            Discharge Procedure Orders  Diet general     Activity as tolerated     Call MD for:  temperature >100.4     Call MD for:  persistent nausea and vomiting     Call MD for:  severe uncontrolled pain     Call MD for:   difficulty breathing, headache or visual disturbances     Call MD for:  redness, tenderness, or signs of infection (pain, swelling, redness, odor or green/yellow discharge around incision site)     Call MD for:  hives     Call MD for:  persistent dizziness or light-headedness     No dressing needed       Francisco Juarez

## 2017-12-27 NOTE — ANESTHESIA PREPROCEDURE EVALUATION
12/27/2017  Gaviota Frazier is a 57 y.o., female.    Anesthesia Evaluation    I have reviewed the Patient Summary Reports.    I have reviewed the Nursing Notes.   I have reviewed the Medications.     Review of Systems  Anesthesia Hx:  No problems with previous Anesthesia    Cardiovascular:   Hypertension ECG has been reviewed.   Normal sinus rhythm with sinus arrhythmia  Possible Left atrial enlargement  Borderline Abnormal ECG  When compared with ECG of 04-MAR-2014 12:13,  Previous ECG has undetermined rhythm, needs review  Nonspecific T wave abnormality now evident in Inferior leads  Confirmed by VIJAYA TERRY MD ([id number]) on 12/20/2017 10:31:18 PM   Hepatic/GI:   GERD, well controlled    Endocrine:   Hypothyroidism        Physical Exam  General:  Well nourished    Airway/Jaw/Neck:  Airway Findings: Mouth Opening: Normal Tongue: Normal  General Airway Assessment: Adult  Mallampati: II  TM Distance: Normal, at least 6 cm      Dental:  Dental Findings: In tact   Chest/Lungs:  Chest/Lungs Findings: Normal Respiratory Rate              Anesthesia Plan  Type of Anesthesia, risks & benefits discussed:  Anesthesia Type:  MAC  Patient's Preference:   Intra-op Monitoring Plan:   Intra-op Monitoring Plan Comments:   Post Op Pain Control Plan:   Post Op Pain Control Plan Comments:   Induction:   IV  Beta Blocker:  Patient is on a Beta-Blocker and has received one dose within the past 24 hours (No further documentation required).       Informed Consent: Patient understands risks and agrees with Anesthesia plan.  Questions answered.   ASA Score: 2     Day of Surgery Review of History & Physical: I have interviewed and examined the patient. I have reviewed the patient's H&P dated:  There are no significant changes.          Ready For Surgery From Anesthesia Perspective.

## 2017-12-27 NOTE — H&P
Short Stay Endoscopy History and Physical    PCP - Princess Malave MD    Procedure - screening colonoscopy  ASA - 1  Mallampati - per anesthesia  History of Anesthesia problems - no  Family history Anesthesia problems -  no     HPI:  This is a 57 y.o.female here for evaluation of :     Reflux - no  Dysphagia - no  Abdominal pain - no  Diarrhea - no  Anemia - no  GI bleeding - no  Nausea and vomiting-no  Early satiety-no  aversion to sight or smell of food-no    ROS:  Constitutional: No fevers, chills, No weight loss  ENT: No allergies  CV: No chest pain  Pulm: No cough, No shortness of breath  Ophtho: No vision changes  GI: see HPI  Derm: No rash  Heme: No lymphadenopathy, No bruising  MSK: No arthritis  : No dysuria, No hematuria  Endo: No hot or cold intolerance  Neuro: No syncope, No seizure  Psych: No anxiety, No depression    Medical History:  Past Medical History:   Diagnosis Date    GERD (gastroesophageal reflux disease)     HTN (hypertension)     Hypercholesteremia     Hypothyroidism (acquired) 2014    Nontoxic nodular goiter 2014       Surgical History:  Past Surgical History:   Procedure Laterality Date     SECTION      TOTAL ABDOMINAL HYSTERECTOMY      benign    TOTAL THYROIDECTOMY  2014       Family History:  Family History   Problem Relation Age of Onset    Hypertension Mother     Heart disease Mother 75     CABG    Hypertension Father        Social History:  Social History     Social History    Marital status: Single     Spouse name: N/A    Number of children: 2    Years of education: N/A     Occupational History    [employer]     Social History Main Topics    Smoking status: Never Smoker    Smokeless tobacco: Never Used    Alcohol use No    Drug use: No    Sexual activity: Yes     Other Topics Concern    Not on file     Social History Narrative    She is a  for the [employer].        Allergies:   Review  of patient's allergies indicates:   Allergen Reactions    Beni Swelling     To face    Unable to assess Nausea And Vomiting     SQUASH FO[employer] ALLERGY       Medications:   No current facility-administered medications on file prior to encounter.      Current Outpatient Prescriptions on File Prior to Encounter   Medication Sig Dispense Refill    fluticasone (FLONASE) 50 mcg/actuation nasal spray 1 spray by Each Nare route once daily. 16 g 0    levothyroxine (SYNTHROID) 100 MCG tablet Take 1 tablet (100 mcg total) by mouth once daily. 30 tablet 1    montelukast (SINGULAIR) 10 mg tablet Take 1 tablet (10 mg total) by mouth every evening. 30 tablet 0    sodium,potassium,mag sulfates (SUPREP BOWEL PREP KIT) 17.5-3.13-1.6 gram SolR Take it as directed 1 Bottle 0       Objective Findings:    Vital Signs:  Vitals:    12/27/17 0623   BP: (!) 116/51   Pulse: (!) 57   Resp: 15   Temp: 97.8 °F (36.6 °C)           Physical Exam:  General Appearance: Well appearing in no acute distress  Eyes:    No scleral icterus  ENT: Neck supple, Lips, mucosa, and tongue normal; teeth and gums normal  Lungs: CTA bilaterally in anterior and posterior fields, no wheezes, no crackles.  Heart:  Regular rate, S1, S2 normal, no murmurs heard.  Abdomen: Soft, non tender, non distended with normal bowel sounds. No hepatosplenomegaly, ascites, or mass.  Extremities: No clubbing, cyanosis or edema  Skin: No rash    Labs:  Reviewed    Plan:  I have explained the risks and benefits of endoscopy procedures to the patient including but not limited to bleeding, perforation, infection, and death. The patient wishes to proceed.     Francisco Juarez

## 2017-12-27 NOTE — ANESTHESIA POSTPROCEDURE EVALUATION
"Anesthesia Post Evaluation    Patient: Gaviota Frazier    Procedure(s) Performed: Procedure(s) (LRB):  COLONOSCOPY (N/A)    Final Anesthesia Type: MAC  Patient location during evaluation: GI PACU  Patient participation: Yes- Able to Participate  Level of consciousness: awake and alert  Post-procedure vital signs: reviewed and stable  Pain management: adequate  Airway patency: patent  PONV status at discharge: No PONV  Anesthetic complications: no      Cardiovascular status: blood pressure returned to baseline  Respiratory status: unassisted and spontaneous ventilation  Hydration status: euvolemic  Follow-up not needed.        Visit Vitals  BP (!) 116/51 (BP Location: Left arm)   Pulse (!) 57   Temp 36.6 °C (97.8 °F) (Oral)   Resp 15   Ht 5' 7" (1.702 m)   Wt 73 kg (161 lb)   SpO2 100%   Breastfeeding? No   BMI 25.22 kg/m²       Pain/Kenisha Score: Pain Assessment Performed: Yes (12/27/2017  7:38 AM)  Presence of Pain: non-verbal indicators absent (12/27/2017  7:38 AM)  Kenisha Score: 9 (12/27/2017  7:38 AM)      "

## 2017-12-27 NOTE — DISCHARGE INSTRUCTIONS
Colonoscopy     A camera attached to a flexible tube with a viewing lens is used to take video pictures.     Colonoscopy is a test to view the inside of your lower digestive tract (colon and rectum). Sometimes it can show the last part of the small intestine (ileum). During the test, small pieces of tissue may be removed for testing. This is called a biopsy. Small growths, such as polyps, may also be removed.   Why is colonoscopy done?  The test is done to help look for colon cancer. And it can help find the source of abdominal pain, bleeding, and changes in bowel habits. It may be needed once a year, depending on factors such as your:  · Age  · Health history  · Family health history  · Symptoms  · Results from any prior colonoscopy  Risks and possible complications  These include:  · Bleeding               · A puncture or tear in the colon   · Risks of anesthesia  · A cancer lesion not being seen  Getting ready   To prepare for the test:  · Talk with your healthcare provider about the risks of the test (see below). Also ask your healthcare provider about alternatives to the test.  · Tell your healthcare provider about any medicines you take. Also tell him or her about any health conditions you may have.  · Make sure your rectum and colon are empty for the test. Follow the diet and bowel prep instructions exactly. If you dont, the test may need to be rescheduled.  · Plan for a friend or family member to drive you home after the test.     Colonoscopy provides an inside view of the entire colon.     You may discuss the results with your doctor right away or at a future visit.  During the test   The test is usually done in the hospital on an outpatient basis. This means you go home the same day. The procedure takes about 30 minutes. During that time:  · You are given relaxing (sedating) medicine through an IV line. You may be drowsy, or fully asleep.  · The healthcare provider will first give you a physical exam to  check for anal and rectal problems.  · Then the anus is lubricated and the scope inserted.  · If you are awake, you may have a feeling similar to needing to have a bowel movement. You may also feel pressure as air is pumped into the colon. Its OK to pass gas during the procedure.  · Biopsy, polyp removal, or other treatments may be done during the test.  After the test   You may have gas right after the test. It can help to try to pass it to help prevent later bloating. Your healthcare provider may discuss the results with you right away. Or you may need to schedule a follow-up visit to talk about the results. After the test, you can go back to your normal eating and other activities. You may be tired from the sedation and need to rest for a few hours.  Date Last Reviewed: 11/1/2016 © 2000-2017 [employer] [address] [place] [address], [place] [address]. All rights reserved. This information is not intended as a substitute for professional medical care. Always follow your healthcare professional's instructions.

## 2017-12-27 NOTE — PLAN OF CARE
Family not in hospital, unable to locate ride. Alternate ride called for , will arrive in 20-25 min

## 2017-12-27 NOTE — TRANSFER OF CARE
"Anesthesia Transfer of Care Note    Patient: Gaviota Frazier    Procedure(s) Performed: Procedure(s) (LRB):  COLONOSCOPY (N/A)    Patient location: GI    Anesthesia Type: MAC    Transport from OR: Transported from OR on room air with adequate spontaneous ventilation    Post pain: adequate analgesia    Post assessment: no apparent anesthetic complications    Post vital signs: stable    Level of consciousness: awake, alert and oriented    Nausea/Vomiting: no nausea/vomiting    Complications: none    Transfer of care protocol was followed      Last vitals:   Visit Vitals  BP (!) 116/51 (BP Location: Left arm)   Pulse (!) 57   Temp 36.6 °C (97.8 °F) (Oral)   Resp 15   Ht 5' 7" (1.702 m)   Wt 73 kg (161 lb)   SpO2 100%   Breastfeeding? No   BMI 25.22 kg/m²     "

## 2017-12-27 NOTE — ANESTHESIA RELEASE NOTE
"Anesthesia Release from PACU Note    Patient: Gaviota Frazier    Procedure(s) Performed: Procedure(s) (LRB):  COLONOSCOPY (N/A)    Anesthesia type: MAC    Post pain: Adequate analgesia    Post assessment: no apparent anesthetic complications, tolerated procedure well and no evidence of recall    Last Vitals:   Visit Vitals  BP (!) 116/51 (BP Location: Left arm)   Pulse (!) 57   Temp 36.6 °C (97.8 °F) (Oral)   Resp 15   Ht 5' 7" (1.702 m)   Wt 73 kg (161 lb)   SpO2 100%   Breastfeeding? No   BMI 25.22 kg/m²       Post vital signs: stable    Level of consciousness: awake and alert     Nausea/Vomiting: no nausea/no vomiting    Complications: none    Airway Patency: patent    Respiratory: unassisted, spontaneous ventilation    Cardiovascular: stable and blood pressure at baseline    Hydration: euvolemic  "

## 2017-12-28 VITALS
TEMPERATURE: 98 F | HEIGHT: 67 IN | RESPIRATION RATE: 16 BRPM | DIASTOLIC BLOOD PRESSURE: 79 MMHG | BODY MASS INDEX: 25.27 KG/M2 | WEIGHT: 161 LBS | HEART RATE: 61 BPM | OXYGEN SATURATION: 100 % | SYSTOLIC BLOOD PRESSURE: 141 MMHG

## 2018-01-11 ENCOUNTER — PATIENT MESSAGE (OUTPATIENT)
Dept: INTERNAL MEDICINE | Facility: CLINIC | Age: 58
End: 2018-01-11

## 2018-02-15 ENCOUNTER — PATIENT MESSAGE (OUTPATIENT)
Dept: INTERNAL MEDICINE | Facility: CLINIC | Age: 58
End: 2018-02-15

## 2018-03-01 ENCOUNTER — PATIENT MESSAGE (OUTPATIENT)
Dept: INTERNAL MEDICINE | Facility: CLINIC | Age: 58
End: 2018-03-01

## 2018-07-02 ENCOUNTER — PATIENT MESSAGE (OUTPATIENT)
Dept: INTERNAL MEDICINE | Facility: CLINIC | Age: 58
End: 2018-07-02

## 2018-07-30 DIAGNOSIS — E03.9 HYPOTHYROIDISM, UNSPECIFIED TYPE: ICD-10-CM

## 2018-07-30 RX ORDER — LEVOTHYROXINE SODIUM 100 UG/1
100 TABLET ORAL DAILY
Qty: 30 TABLET | Refills: 1 | Status: CANCELLED | OUTPATIENT
Start: 2018-07-30

## 2018-07-30 RX ORDER — LEVOTHYROXINE SODIUM 100 UG/1
100 TABLET ORAL DAILY
Qty: 30 TABLET | Refills: 0 | Status: SHIPPED | OUTPATIENT
Start: 2018-07-30 | End: 2018-08-25 | Stop reason: SDUPTHER

## 2018-07-30 NOTE — TELEPHONE ENCOUNTER
Pt's prior endocrinologist refused to refill medication. Dr. Mccallum will not refill medications without a clinic visit, which is scheduled for 08/13/18. Pt requested refill request be sent to PCP. Please advise

## 2018-07-30 NOTE — TELEPHONE ENCOUNTER
----- Message from Sathish Valencia sent at 7/30/2018 10:32 AM CDT -----  Contact: self  Pt is requesting a call back at [phone] concerning Thyroid medication refill

## 2018-07-30 NOTE — TELEPHONE ENCOUNTER
Thirty day supply has been sent on levothyroxine 100 mcg p.o. Daily, patient to establish care with Dr. earl and get further refills

## 2018-08-13 ENCOUNTER — OFFICE VISIT (OUTPATIENT)
Dept: ENDOCRINOLOGY | Facility: CLINIC | Age: 58
End: 2018-08-13
Payer: COMMERCIAL

## 2018-08-13 ENCOUNTER — LAB VISIT (OUTPATIENT)
Dept: LAB | Facility: HOSPITAL | Age: 58
End: 2018-08-13
Attending: INTERNAL MEDICINE
Payer: COMMERCIAL

## 2018-08-13 VITALS
TEMPERATURE: 98 F | DIASTOLIC BLOOD PRESSURE: 68 MMHG | HEIGHT: 67 IN | HEART RATE: 68 BPM | BODY MASS INDEX: 25.88 KG/M2 | WEIGHT: 164.88 LBS | SYSTOLIC BLOOD PRESSURE: 112 MMHG

## 2018-08-13 DIAGNOSIS — E89.0 POSTOPERATIVE HYPOTHYROIDISM: Primary | ICD-10-CM

## 2018-08-13 DIAGNOSIS — E89.0 POSTOPERATIVE HYPOTHYROIDISM: ICD-10-CM

## 2018-08-13 LAB
T3FREE SERPL-MCNC: 2.4 PG/ML
T4 FREE SERPL-MCNC: 1.16 NG/DL
TSH SERPL DL<=0.005 MIU/L-ACNC: 0.22 UIU/ML

## 2018-08-13 PROCEDURE — 3074F SYST BP LT 130 MM HG: CPT | Mod: CPTII,S$GLB,, | Performed by: INTERNAL MEDICINE

## 2018-08-13 PROCEDURE — 84481 FREE ASSAY (FT-3): CPT

## 2018-08-13 PROCEDURE — 99999 PR PBB SHADOW E&M-EST. PATIENT-LVL III: CPT | Mod: PBBFAC,,, | Performed by: INTERNAL MEDICINE

## 2018-08-13 PROCEDURE — 36415 COLL VENOUS BLD VENIPUNCTURE: CPT | Mod: PO

## 2018-08-13 PROCEDURE — 99214 OFFICE O/P EST MOD 30 MIN: CPT | Mod: S$GLB,,, | Performed by: INTERNAL MEDICINE

## 2018-08-13 PROCEDURE — 84443 ASSAY THYROID STIM HORMONE: CPT

## 2018-08-13 PROCEDURE — 3078F DIAST BP <80 MM HG: CPT | Mod: CPTII,S$GLB,, | Performed by: INTERNAL MEDICINE

## 2018-08-13 PROCEDURE — 84439 ASSAY OF FREE THYROXINE: CPT

## 2018-08-13 PROCEDURE — 3008F BODY MASS INDEX DOCD: CPT | Mod: CPTII,S$GLB,, | Performed by: INTERNAL MEDICINE

## 2018-08-13 NOTE — PROGRESS NOTES
Subjective:       Patient ID: Gaviota Frazier is a 58 y.o. female.  Patient is new to me  Chief Complaint: Hypothyroidism    HPI    Consultation was requested by No ref. provider found       Diagnosed:  Patient previously followed by Dr. Mark Dixon and also saw Dr. Felice Melissa once    Previous radiology tests:  Thyroid ultrasound : Yes - had gotten a neck ultrasound due to neck swelling March 2017 that was essentially unremarkable   NM uptake and scan: No    Previous thyroid surgery: Yes July 2014      Thyroid symptoms:denies fatigue, weight changes, heat/cold intolerance, bowel/skin changes or CVS symptoms      Thyroid medications:  Levothyroxine 100 mcg      Takes medication appropriately: Yes takes at 5am, not on vitamins    I have reviewed the past medical, family and social history  Review of Systems   Constitutional: Negative for appetite change, fatigue, fever and unexpected weight change.   HENT: Negative for sore throat and trouble swallowing.    Eyes: Negative for visual disturbance.   Respiratory: Negative for shortness of breath and wheezing.    Cardiovascular: Negative for chest pain, palpitations and leg swelling.   Gastrointestinal: Negative for diarrhea, nausea and vomiting.   Endocrine: Negative for cold intolerance, heat intolerance, polydipsia, polyphagia and polyuria.   Genitourinary: Negative for difficulty urinating, dysuria and menstrual problem.   Musculoskeletal: Negative for arthralgias and joint swelling.   Skin: Negative for rash.   Neurological: Negative for dizziness, weakness, numbness and headaches.   Psychiatric/Behavioral: Negative for confusion, dysphoric mood and sleep disturbance.       Objective:      Physical Exam   Constitutional: She is oriented to person, place, and time. She appears well-developed and well-nourished. No distress.   HENT:   Head: Normocephalic and atraumatic.   Right Ear: External ear normal.   Left Ear: External ear normal.   Nose: Nose normal.    Mouth/Throat: Oropharynx is clear and moist. No oropharyngeal exudate.   Eyes: Conjunctivae and EOM are normal. Pupils are equal, round, and reactive to light. No scleral icterus.   Neck: No JVD present. No tracheal deviation present. No thyromegaly present.   Cardiovascular: Normal rate, regular rhythm, normal heart sounds and intact distal pulses. Exam reveals no gallop and no friction rub.   No murmur heard.  Pulmonary/Chest: Effort normal and breath sounds normal. No respiratory distress. She has no wheezes. She has no rales.   Abdominal: Soft. Bowel sounds are normal. She exhibits no distension and no mass. There is no tenderness. There is no rebound and no guarding. No hernia.   Musculoskeletal: She exhibits no edema or deformity.   Lymphadenopathy:     She has no cervical adenopathy.   Neurological: She is alert and oriented to person, place, and time. She has normal reflexes. No cranial nerve deficit.   Skin: Skin is warm. No rash noted. No erythema.   Psychiatric: She has a normal mood and affect. Her behavior is normal.   Vitals reviewed.        Lab Review:   Lab Results   Component Value Date    TSH 4.610 (H) 12/21/2017    TSH 96.242 (H) 03/17/2017    TSH 0.700 11/21/2016    TSH 0.732 07/11/2016     Lab Results   Component Value Date    FREET4 1.03 12/21/2017    FREET4 0.40 (L) 03/17/2017    FREET4 0.64 (L) 04/20/2016    FREET4 0.99 09/02/2015     No components found for: FREET3      Assessment:     1. Postoperative hypothyroidism  T3, free    T4, free    TSH    check TFTs and I will refill her Synthroid once I review test results  Plan:   Postoperative hypothyroidism  -     T3, free; Future; Expected date: 08/13/2018  -     T4, free; Future; Expected date: 08/13/2018  -     TSH; Future; Expected date: 08/13/2018        Follow-up in about 6 months (around 2/13/2019).     Thank you to No ref. provider found for this consultation      Disclaimer:  This note may have been partially prepared using voice  recognition software and  it may have not been extensively proofed, as such there could be errors within the text such as sound alike errors.

## 2018-08-25 ENCOUNTER — PATIENT MESSAGE (OUTPATIENT)
Dept: ENDOCRINOLOGY | Facility: CLINIC | Age: 58
End: 2018-08-25

## 2018-08-25 DIAGNOSIS — E03.9 HYPOTHYROIDISM, UNSPECIFIED TYPE: ICD-10-CM

## 2018-08-25 RX ORDER — LEVOTHYROXINE SODIUM 100 UG/1
100 TABLET ORAL DAILY
Qty: 30 TABLET | Refills: 5 | Status: SHIPPED | OUTPATIENT
Start: 2018-08-25 | End: 2018-12-23 | Stop reason: DRUGHIGH

## 2018-08-27 ENCOUNTER — TELEPHONE (OUTPATIENT)
Dept: ENDOCRINOLOGY | Facility: CLINIC | Age: 58
End: 2018-08-27

## 2018-10-28 ENCOUNTER — PATIENT MESSAGE (OUTPATIENT)
Dept: NEUROLOGY | Facility: CLINIC | Age: 58
End: 2018-10-28

## 2018-10-30 DIAGNOSIS — G56.03 BILATERAL CARPAL TUNNEL SYNDROME: Primary | ICD-10-CM

## 2018-11-28 ENCOUNTER — OFFICE VISIT (OUTPATIENT)
Dept: PODIATRY | Facility: CLINIC | Age: 58
End: 2018-11-28
Payer: COMMERCIAL

## 2018-11-28 ENCOUNTER — OFFICE VISIT (OUTPATIENT)
Dept: PHYSICAL MEDICINE AND REHAB | Facility: CLINIC | Age: 58
End: 2018-11-28
Payer: COMMERCIAL

## 2018-11-28 VITALS
BODY MASS INDEX: 25.74 KG/M2 | SYSTOLIC BLOOD PRESSURE: 187 MMHG | RESPIRATION RATE: 14 BRPM | WEIGHT: 164 LBS | HEART RATE: 72 BPM | DIASTOLIC BLOOD PRESSURE: 87 MMHG | HEIGHT: 67 IN

## 2018-11-28 VITALS
HEART RATE: 74 BPM | WEIGHT: 164 LBS | DIASTOLIC BLOOD PRESSURE: 79 MMHG | OXYGEN SATURATION: 97 % | TEMPERATURE: 97 F | BODY MASS INDEX: 25.74 KG/M2 | HEIGHT: 67 IN | SYSTOLIC BLOOD PRESSURE: 151 MMHG

## 2018-11-28 DIAGNOSIS — L84 CORN OR CALLUS: ICD-10-CM

## 2018-11-28 DIAGNOSIS — I10 ESSENTIAL HYPERTENSION: ICD-10-CM

## 2018-11-28 DIAGNOSIS — R20.0 NUMBNESS OF TOES: Primary | ICD-10-CM

## 2018-11-28 DIAGNOSIS — G56.03 BILATERAL CARPAL TUNNEL SYNDROME: ICD-10-CM

## 2018-11-28 DIAGNOSIS — G62.9 SENSORY NEUROPATHY: ICD-10-CM

## 2018-11-28 PROCEDURE — 3078F DIAST BP <80 MM HG: CPT | Mod: CPTII,S$GLB,, | Performed by: PODIATRIST

## 2018-11-28 PROCEDURE — 3079F DIAST BP 80-89 MM HG: CPT | Mod: CPTII,S$GLB,, | Performed by: PHYSICAL MEDICINE & REHABILITATION

## 2018-11-28 PROCEDURE — 99204 OFFICE O/P NEW MOD 45 MIN: CPT | Mod: 25,S$GLB,, | Performed by: PHYSICAL MEDICINE & REHABILITATION

## 2018-11-28 PROCEDURE — 95911 NRV CNDJ TEST 9-10 STUDIES: CPT | Mod: S$GLB,,, | Performed by: PHYSICAL MEDICINE & REHABILITATION

## 2018-11-28 PROCEDURE — 99999 PR PBB SHADOW E&M-EST. PATIENT-LVL III: CPT | Mod: PBBFAC,,, | Performed by: PODIATRIST

## 2018-11-28 PROCEDURE — 3077F SYST BP >= 140 MM HG: CPT | Mod: CPTII,S$GLB,, | Performed by: PODIATRIST

## 2018-11-28 PROCEDURE — 99203 OFFICE O/P NEW LOW 30 MIN: CPT | Mod: S$GLB,,, | Performed by: PODIATRIST

## 2018-11-28 PROCEDURE — 3008F BODY MASS INDEX DOCD: CPT | Mod: CPTII,S$GLB,, | Performed by: PHYSICAL MEDICINE & REHABILITATION

## 2018-11-28 PROCEDURE — 99999 PR PBB SHADOW E&M-EST. PATIENT-LVL III: CPT | Mod: PBBFAC,,, | Performed by: PHYSICAL MEDICINE & REHABILITATION

## 2018-11-28 PROCEDURE — 3077F SYST BP >= 140 MM HG: CPT | Mod: CPTII,S$GLB,, | Performed by: PHYSICAL MEDICINE & REHABILITATION

## 2018-11-28 PROCEDURE — 3008F BODY MASS INDEX DOCD: CPT | Mod: CPTII,S$GLB,, | Performed by: PODIATRIST

## 2018-11-28 NOTE — PATIENT INSTRUCTIONS
Carpal Tunnel Syndrome    Carpal tunnel syndrome is a painful condition of the wrist and arm. It is caused by pressure on the median nerve.  The median nerve is one of the nerves that give feeling and movement to the hand. It passes through a tunnel in the wrist called the carpal tunnel. This tunnel is made up of bones and ligaments. Narrowing of this tunnel or swelling of the tissues inside the tunnel puts pressure on the median nerve. This causes numbness, pins and needles, or electric shooting pains in your hand and forearm. Often the pain is worse at night and may wake you when you are asleep.  Carpal tunnel syndrome may occur during pregnancy and with use of birth control pills. It is more common in workers who must often bend their wrists. It is also common in people who work with power tools that cause strong vibrations.  Home care  · Rest the painful wrist. Avoid repeated bending of the wrist back and forth. This puts pressure on the median nerve. Avoid using power tools with strong vibrations.  · If you were given a splint, wear it at night while you sleep. You may also wear it during the day for comfort.  · Move your fingers and wrists often to avoid stiffness.  · Elevate your arms on pillows when you lie down.  · Try using the unaffected hand more.  · Try not to hold your wrists in a bent, downward position.  · Sometimes changes in the work place may ease symptoms. If you type most of the day, it may help to change the position of your keyboard or add a wrist support. Your wrist should be in a neutral position and not bent back when typing.  · You may use over-the-counter pain medicine to treat pain and inflammation, unless another medicine was prescribed. Anti-inflammatory pain medicines, such as ibuprofen or naproxen may be more effective than acetaminophen, which treats pain, but not inflammation. If you have chronic liver or kidney disease or ever had a stomach ulcer or GI bleeding, talk with your  doctor before using these medicines.  · Opioid pain medicine will only give temporary relief and does not treat the problem. If pain continues, you may need a shot of a steroid drug into your wrist.  · If the above methods fail, you may need surgery. This will open the carpal tunnel and release the pressure on the trapped nerve.  Follow-up care  Follow up with your healthcare provider, or as advised, if the pain doesnt begin to improve within the next week.  If X-rays were taken, you will be notified of any new findings that may affect your care.  When to seek medical advice  Call your healthcare provider right away if any of these occur:  · Pain not improving with the above treatment  · Fingers or hand become cold, blue, numb, or tingly  · Your whole arm becomes swollen or weak  Date Last Reviewed: 11/23/2015  © 5428-3628 [employer]. [address] [place] [address]. All rights reserved. This information is not intended as a substitute for professional medical care. Always follow your healthcare professional's instructions.        Carpal Tunnel Syndrome Prevention Tips  Some repetitive hand activities put you at higher risk for carpal tunnel syndrome (CTS). But you can reduce your risk. Learn how to change the way you use your hands. Below are tips for at home and on the job. Be sure to also follow the hand and wrist safety policies at your workplace.      Keep your wrist in a neutral (straight) position when exercising.      Keep your wrist in neutral  Keep a neutral (straight) wrist position as often as you can. Dont use your wrist in a bent (flexed) position for long periods of time. This includes extended or twisted positions.  Watch your   Dont just use your thumb and index finger to grasp or lift. This can put stress on your wrist. When you can, use your whole hand and all its fingers to grasp an object.  Minimize repetition  Dont move your arms or hands or hold an object in  the same way for long periods of time. Even simple, light tasks can cause injury this way. Instead, alternate tasks or switch hands.  Rest your hands  Give your hands a break from time to time with a rest. Even a few minutes once an hour can help.  Reduce speed and force  Slow down the speed in which you do a forceful, repetitive motion. This gives your wrist time to recover from the effort. Use power tools to help reduce the force.  Strengthen the muscles  Weak muscles may lead to a poor wrist or arm position. Exercises will make your hand and arm muscles stronger. This can help you keep a better position.  Date Last Reviewed: 9/11/2015 © 2000-2017 [employer]. [address] [place] [address], [place] [address]. All rights reserved. This information is not intended as a substitute for professional medical care. Always follow your healthcare professional's instructions.        Understanding Carpal Tunnel Syndrome    The carpal tunnel is a narrow space inside the wrist. It is ringed by bone and a band of tough tissue called the transverse carpal ligament. A major nerve called the median nerve runs from the forearm into the hand through the carpal tunnel. Tendons also run through the carpal tunnel.  With carpal tunnel syndrome, the tendons or nearby tissues within the carpal tunnel may swell or thicken. Or the transverse carpal ligament may harden and shorten. This narrows the space in the carpal tunnel and puts pressure on the median nerve. This pressure leads to tingling and numbness of the hand and wrist. In time, the condition can make even simple tasks hard to do.  What causes carpal tunnel syndrome?  Doctors arent entirely clear why the condition occurs. Certain things may make a person more likely to have it. These include:  · Being female  · Being pregnant  · Being overweight  · Having diabetes or rheumatoid arthritis  Symptoms of carpal tunnel syndrome  Symptoms often come and go. At first, symptoms  may occur mainly at night. Later, they may be noticed during the day as well. They may get worse with activities such as driving, reading, typing, or holding a phone. Symptoms can include:  · Tingling and numbness in the hand or wrist  · Sharp pain that shoots up the arm or down to the fingers  · Hand stiffness or cramping, especially in the morning  · Trouble making a fist  · Hand weakness and clumsiness  Treatment for carpal tunnel syndrome  Certain treatments help reduce the pressure on the median nerve and relieve symptoms. Choices for treatment may include one or more of the following:  · Wrist splint. This involves wearing a special brace on the wrist and hand. The splint holds the wrist straight, in a neutral position. This helps keep the carpal tunnel as open as possible.  · Cortisone shots. Cortisone is a medicine that helps reduce swelling. It is injected directly into the wrist. It helps shrink tissues inside the carpal tunnel. This relieves symptoms for a time.  · Pain medicines. You may take over-the-counter or prescription medicines to help reduce swelling and relieve symptoms.  · Surgery. If the condition doesnt respond to other treatments and doesnt go away on its own, you may need surgery. During surgery, the surgeon cuts the transverse carpal ligament to relieve pressure on the median nerve.     When to call your healthcare provider  Call your healthcare provider right away if you have any of these:  · Fever of 100.4°F (38°C) or higher, or as directed  · Symptoms that dont get better, or get worse  · New symptoms   Date Last Reviewed: 3/10/2016  © 6297-0221 [employer]. [address] [place] [address], [place] [address]. All rights reserved. This information is not intended as a substitute for professional medical care. Always follow your healthcare professional's instructions.

## 2018-11-28 NOTE — PROGRESS NOTES
PODIATRIC MEDICINE AND SURGERY  11/28/2018    PCP: Dr. Princess Malave MD    CHIEF COMPLAINT   Chief Complaint   Patient presents with    Numbness     toes       HPI:    Gaviota Frazier is a 58 y.o. female who has a past medical history of GERD (gastroesophageal reflux disease), HTN (hypertension), Hypercholesteremia, Hypothyroidism (acquired) (8/28/2014), and Nontoxic nodular goiter (5/1/2014).   Gaviota presents to clinic today complaining of numbness bilateral great toes. Symptoms were one month ago. She has diagnosed carpals tunnel syndrome and is unsure if related. She does have diagnosed EMG with sensory neuropathy. Symptoms have resolved but she wanted to come in for evaluation. Symptoms are not related to shoe wear. Symptoms aggravated NWB and WB. No triggering factors.       Patient denies other pedal complaints at this time.     PMH  Past Medical History:   Diagnosis Date    GERD (gastroesophageal reflux disease)     HTN (hypertension)     Hypercholesteremia     Hypothyroidism (acquired) 8/28/2014    Nontoxic nodular goiter 5/1/2014       PROBLEM LIST  Patient Active Problem List    Diagnosis Date Noted    Special screening for malignant neoplasms, colon 12/27/2017    Antinuclear factor positive 12/05/2016    Neutropenia 12/05/2016    Hypothyroidism (acquired) 08/28/2014    GERD (gastroesophageal reflux disease) 05/01/2014    HTN (hypertension)        MEDS  Current Outpatient Medications on File Prior to Visit   Medication Sig Dispense Refill    carvedilol (COREG) 12.5 MG tablet TAKE 1 TABLET (12.5 MG TOTAL) BY MOUTH 2 (TWO) TIMES DAILY WITH MEALS. 60 tablet 4    fluticasone (FLONASE) 50 mcg/actuation nasal spray 1 spray by Each Nare route once daily. 16 g 0    levothyroxine (SYNTHROID) 100 MCG tablet Take 1 tablet (100 mcg total) by mouth once daily. 30 tablet 5    simvastatin (ZOCOR) 20 MG tablet Take 1 tablet (20 mg total) by mouth every evening. 90 tablet 3     No current  facility-administered medications on file prior to visit.           Medication List           Accurate as of 18  4:39 PM. If you have any questions, ask your nurse or doctor.               CONTINUE taking these medications    carvedilol 12.5 MG tablet  Commonly known as:  COREG  TAKE 1 TABLET (12.5 MG TOTAL) BY MOUTH 2 (TWO) TIMES DAILY WITH MEALS.     fluticasone 50 mcg/actuation nasal spray  Commonly known as:  FLONASE  1 spray by Each Nare route once daily.     levothyroxine 100 MCG tablet  Commonly known as:  SYNTHROID  Take 1 tablet (100 mcg total) by mouth once daily.     simvastatin 20 MG tablet  Commonly known as:  ZOCOR  Take 1 tablet (20 mg total) by mouth every evening.            [facility]     Past Surgical History:   Procedure Laterality Date     SECTION      COLONOSCOPY N/A 2017    Procedure: COLONOSCOPY;  Surgeon: Francisco Juarez MD;  Location: [facility];  Service: Endoscopy;  Laterality: N/A;    COLONOSCOPY N/A 2017    [facility]formed [facility] ENDO    THYROIDECTOMY Bilateral 2014    [facility]    TOTAL ABDOMINAL HYSTERECTOMY      benign    TOTAL THYROIDECTOMY  2014        ALL  Review of patient's allergies indicates:   Allergen Reactions    Guttenberg Swelling     To face    Unable to assess Nausea And Vomiting     SQUASH FOOD ALLERGY       SOC     Social History     Tobacco Use    Smoking status: Never Smoker    Smokeless tobacco: Never Used   Substance Use Topics    Alcohol use: No    Drug use: No         FAMILY HX    Family History   Problem Relation Age of Onset    Hypertension Mother     Heart disease Mother 75        CABG    Hypertension Father             REVIEW OF SYSTEMS  General: This patient is well-developed, well-nourished and appears stated age, well-oriented to person, place and time, and cooperative and pleasant on today's visit  Constitutional: Negative for chills and fever.   Respiratory: Negative  "for shortness of breath.    Cardiovascular: Negative for chest pain, palpitations, orthopnea  Gastrointestinal: Negative for diarrhea, nausea and vomiting.   Musculoskeletal: Positive for above noted in HPI  Skin: Negative for rash, skin, or nail changes  Neurological: Positive for tingling and sensory changes  Peripheral Vascular: no claudication or cyanosis  Psychiatric/Behavioral: Negative for altered mental status     PHYSICAL EXAM:      Vitals:    11/28/18 1608   BP: (!) 151/79   Pulse: 74   Temp: 97.4 °F (36.3 °C)   SpO2: 97%   Weight: 74.4 kg (164 lb 0.4 oz)   Height: 5' 7" (1.702 m)         LOWER EXTREMITY PHYSICAL EXAM  VASCULAR  Dorsalis pedis and posterior tibial pulses palpable 2/4 bilaterally. Capillary refill time immediate to the toes. Feet are warm to the touch. Skin temperature warm to warm from proximally to distally There are  No varicosities, telangiectasias noted to bilateral foot and ankle regions. There are no ecchymoses noted to bilateral foot and ankle regions. There is no gross lower extremity edema.    DERMATOLOGIC  Skin moist with healthy texture and turgor.There are no open ulcerations, lacerations, or fissures to bilateral foot and ankle regions. There are no signs of infection as there is no erythema, no proximal-extending lymphangiitis, no fluctuance, or crepitus noted on palpation to bilateral foot and ankle regions. There is no interdigital maceration.   There are diffuse hyperkeratotic lesions noted to feet. Nails are well-trimmed.    NEUROLOGIC  Epicritic sensation is intact to all 10 sites as tested with SWMF 5.07g as the patient is able to sense light touch to bilateral foot and ankle regions. Achilles and patellar deep tendon reflexes intact. Babinski reflex absent    ORTHOPEDIC/BIOMECHANICAL  No symptomatic structural abnormalities noted. Muscle strength AT/EHL/EDL/PT: 5/5; Achilles/Gastroc/Soleus: 5/5; PB/PL: 5/5 Muscle tone is normal. Ankle joint ROM non painful with DF/PF, " non-crepitus; STJ ROM  Inv/ev non painful, non crepitus ; MTPJ b/l  DF/PF, non crepitus    ASSESSMENT     Encounter Diagnoses   Name Primary?    Numbness of toes Yes    Sensory neuropathy     Corn or callus          PLAN  Patient was educated about clinical and imaging findings, and verbalizes understanding of above.     Diagnoses and all orders for this visit:  Numbness of toes    Sensory neuropathy    Corn or callus      At this point there are no clinical signs of podiatric neurological deficits.  I do recommend callus cream to help with thickened skin on distal aspect of bilateral great toes.  Information provided in AVS patient is currently asymptomatic in regards to neurological symptoms on toes. She was encouraged to return back to my clinical contact my office if symptoms do return.    .      Disclaimer:  This note may have been prepared using voice recognition software, it may have not been extensively proofed, as such there could be errors within the text such as sound alike errors.         Future Appointments   Date Time Provider Department Center   12/21/2018  8:20 AM Princess Malave MD [facility] IM [place]   2/13/2019  9:00 AM Nimisha Mccallum MD [facility] ENDO [place]       Report Electronically Signed By:     Lucina Francois DPM   Podiatry  [facility]  11/28/2018

## 2018-11-28 NOTE — LETTER
November 28, 2018      Von Tineo MD  [address] [facility] [address] [place] [address]           [facility] - Physiatry  [address] [place] [address]  Phone: [phone]  Fax: [phone]          Patient: Gaviota Frazier   MR Number: [id number]   YOB: 1960   Date of Visit: 11/28/2018       Dear Dr. Von Tineo:    Thank you for referring Gaviota Frazier to me for evaluation. Attached you will find relevant portions of my assessment and plan of care.    If you have questions, please do not hesitate to call me. I look forward to following Gaviota Frazier along with you.    Sincerely,    Polina Hernandez MD    Enclosure  CC:  No Recipients    If you would like to receive this communication electronically, please contact [email or web address] or [phone] to request more information on [employer] access.    For providers and/or their staff who would like to refer a patient to [facility], please contact us through our one-stop-shop provider referral line, [facility], at [phone].    If you feel you have received this communication in error or would no longer like to receive these types of communications, please e-mail [email or web address]

## 2018-11-28 NOTE — PATIENT INSTRUCTIONS
You can purchase UREA 40% cream online     [email or web address]     [employer]   Urea 40% Foot Cream 4 oz - Best Callus Remover - Moisturizes & Rehydrates Thick, Cracked, Rough, Dead & Dry Skin - For Feet, Elbows and Hands + Free Pumice Stone - 100% Money Back Guarantee   4.6 out of 5 stars 1,181   $14.99 Prime    You can also purchase Flexitol heel balm cream. This can be found at [facility] or online [email or web address]

## 2018-11-28 NOTE — PROGRESS NOTES
[facility] [address] [place] [address]  Phone: [phone]          Full Name: Gaviota Frazier YOB: 1960  Patient ID: [id number]      Visit Date: 11/28/2018 14:00  Age: 58 Years 8 Months Old  Examining Physician: Polina Hernandez M.D.  Referring Physician: Dr CINDY Tineo  Reason for Referral: hand pain    Chief Complaint   Patient presents with    Numbness     hands       HPI: This is a 58 y.o.  female being seen in clinic today for evaluation of chronic hand numbness/tingling that has worsened/returned recently.  She has numbness, tingling, and swelling in her hands.  With increased hand usage, her symptoms can worsen.  Resting her hands on ly provide minimal relief.  She had an EMG in the past-diagnosed with sensory neuropathy.    History obtained from patient    Past family, medical, social, and surgical history reviewed in chart    Review of Systems:     General- denies lethargy, weight change, fever, chills  Head/neck- denies swallowing difficulties  ENT- denies hearing changes  Cardiovascular-denies chest pain  Pulmonary- denies shortness of breath  GI- denies constipation or bowel incontinence  - denies bladder incontinence  Skin- denies wounds or rashes  Musculoskeletal- denies weakness, +pain  Neurologic- +numbness and tingling  Psychiatric- denies depressive or psychotic features, denies anxiety  Lymphatic-denies swelling  Endocrine- denies hypoglycemic symptoms/DM history  All other pertinent systems negative     Physical Examination:  General: Well developed, well nourished female, NAD  HEENT:NCAT EOMI bilaterally   Pulmonary:Normal respirations    Spinal Examination: CERVICAL  Active ROM is within normal limits.  Inspection: No deformity of spinal alignment.  Palpation: No vertebral tenderness to percussion.      Spinal Examination: LUMBAR or THORACIC  Active ROM is within normal limits.  Inspection: No deformity of spinal alignment.    Palpation: No vertebral  tenderness to percussion.      Musculoskeletal Tests:  Phalen: neg  Elbow compression (ulnar): neg  Tinels at wrist: neg    Bilateral Upper and Lower Extremities:  Pulses are 2+ at radial bilaterally.  Shoulder/Elbow/Wrist/Hand ROM wnl  Hip/Knee/Ankle ROM   Bilateral Extremities show normal capillary refill.  No signs of cyanosis, rubor, edema, skin changes, or dysvascular changes of appendages.  Nails appear intact.    Neurological Exam:  Cranial Nerves:  II-XII grossly intact    Manual Muscle Testing: (Motor 5=normal)  5/5 strength bilateral upper extremities    No focal atrophy is noted of either upper extremity.    Bilateral Reflexes:1+bic tric br  Jones's response is absent bilaterally.    Sensation: tested to light touch  - intact in arms  Gait: Narrow base and good arm swing.      Entire procedure explained to patient prior to proceeding.  Verbal consent obtained      SNC      Nerve / Sites Rec. Site Onset Lat Peak Lat Amp Segments Distance Velocity     ms ms µV  mm m/s   R Median - Digit II (Antidromic)      Wrist Dig II 3.1 4.0 26.2 Wrist - Dig  46   L Median - Digit II (Antidromic)      Wrist Dig II 3.3 4.2 20.4 Wrist - Dig  43   R Ulnar - Digit V (Antidromic)      Wrist Dig V 2.9 3.8 31.1 Wrist - Dig V 140 48   L Ulnar - Digit V (Antidromic)      Wrist Dig V 2.8 3.8 37.2 Wrist - Dig V 140 50   R Radial - Anatomical snuff box (Forearm)      Forearm Wrist 1.9 2.7 14.2 Forearm - Wrist 100 53   L Radial - Anatomical snuff box (Forearm)      Forearm Wrist 2.0 2.7 17.2 Forearm - Wrist 100 51       MNC      Nerve / Sites Muscle Latency Amplitude Duration Rel Amp Segments Distance Lat Diff Velocity     ms mV ms %  mm ms m/s   R Median - APB      Wrist APB 3.4 10.2 7.1 100 Wrist - APB 80        Elbow APB 8.3 9.8 7.6 95.7 Elbow - Wrist 240 4.8 50   L Median - APB      Wrist APB 3.0 14.0 6.7 100 Wrist - APB 80        Elbow APB 8.0 12.4 7.5 88.6 Elbow - Wrist 250 5.0 50   R Ulnar - ADM      Wrist ADM  2.8 12.1 6.5 100 Wrist - ADM 80        B.Elbow ADM 7.4 12.1 6.7 99.9 B.Elbow - Wrist 250 4.6 55      A.Elbow ADM 9.8 12.0 7.2 99.8 A.Elbow - B.Elbow 120 2.4 50         A.Elbow - Wrist  7.0    L Ulnar - ADM      Wrist ADM 2.7 11.9 6.6 100 Wrist - ADM 80        B.Elbow ADM 7.2 11.2 6.7 93.9 B.Elbow - Wrist 240 4.5 53      A.Elbow ADM 9.7 11.1 7.2 99.4 A.Elbow - B.Elbow 130 2.5 52         A.Elbow - Wrist  7.0                                       *Pt's hands required re-warming  INTERPRETATION  -Bilateral median motor nerve conduction study showed normal latency, amplitude, and conduction velocity  -Bilateral median sensory nerve conduction study showed prolonged peak latency and normal amplitude  -Bilateral ulnar motor nerve conduction study showed normal latency, amplitude, and conduction velocity  -Bilateral ulnar sensory nerve conduction study showed normal peak latency and amplitude  -Bilateral radial sensory nerve conduction study showed normal peak latency and amplitude      IMPRESSION  1. ABNORMAL study  2. There is electrodiagnostic evidence of a MILD demyelinating median neuropathy (Carpal tunnel syndrome) across BILATERAL wrists    PLAN  1. Follow up with referring provider: Dr. Von Tineo  2. Handouts on CTS prevention provided. Rec wrist braces and natural anti-inflammatory options  3. This study is good for one year. If symptoms worsen or do not improve, please re-consult.    Polina Hernandez M.D.  Physical Medicine and Rehab

## 2018-11-29 RX ORDER — CARVEDILOL 12.5 MG/1
12.5 TABLET ORAL 2 TIMES DAILY WITH MEALS
Qty: 60 TABLET | Refills: 0 | Status: SHIPPED | OUTPATIENT
Start: 2018-11-29 | End: 2018-12-14 | Stop reason: SDUPTHER

## 2018-12-14 DIAGNOSIS — I10 ESSENTIAL HYPERTENSION: ICD-10-CM

## 2018-12-14 RX ORDER — CARVEDILOL 12.5 MG/1
12.5 TABLET ORAL 2 TIMES DAILY WITH MEALS
Qty: 60 TABLET | Refills: 0 | Status: SHIPPED | OUTPATIENT
Start: 2018-12-14 | End: 2018-12-21 | Stop reason: DRUGHIGH

## 2018-12-21 ENCOUNTER — CLINICAL SUPPORT (OUTPATIENT)
Dept: CARDIOLOGY | Facility: CLINIC | Age: 58
End: 2018-12-21
Payer: COMMERCIAL

## 2018-12-21 ENCOUNTER — OFFICE VISIT (OUTPATIENT)
Dept: INTERNAL MEDICINE | Facility: CLINIC | Age: 58
End: 2018-12-21
Payer: COMMERCIAL

## 2018-12-21 ENCOUNTER — LAB VISIT (OUTPATIENT)
Dept: LAB | Facility: HOSPITAL | Age: 58
End: 2018-12-21
Attending: FAMILY MEDICINE
Payer: COMMERCIAL

## 2018-12-21 VITALS
SYSTOLIC BLOOD PRESSURE: 120 MMHG | HEIGHT: 67 IN | BODY MASS INDEX: 26.37 KG/M2 | OXYGEN SATURATION: 99 % | WEIGHT: 168 LBS | DIASTOLIC BLOOD PRESSURE: 84 MMHG | TEMPERATURE: 98 F | HEART RATE: 68 BPM

## 2018-12-21 DIAGNOSIS — Z00.00 ROUTINE GENERAL MEDICAL EXAMINATION AT A HEALTH CARE FACILITY: ICD-10-CM

## 2018-12-21 DIAGNOSIS — K21.9 GASTROESOPHAGEAL REFLUX DISEASE, ESOPHAGITIS PRESENCE NOT SPECIFIED: ICD-10-CM

## 2018-12-21 DIAGNOSIS — I10 ESSENTIAL HYPERTENSION: ICD-10-CM

## 2018-12-21 DIAGNOSIS — Z23 NEED FOR DIPHTHERIA-TETANUS-PERTUSSIS (TDAP) VACCINE: ICD-10-CM

## 2018-12-21 DIAGNOSIS — D70.9 NEUTROPENIA, UNSPECIFIED TYPE: ICD-10-CM

## 2018-12-21 DIAGNOSIS — Z00.00 ROUTINE GENERAL MEDICAL EXAMINATION AT A HEALTH CARE FACILITY: Primary | ICD-10-CM

## 2018-12-21 DIAGNOSIS — E03.9 HYPOTHYROIDISM (ACQUIRED): ICD-10-CM

## 2018-12-21 LAB
ALBUMIN SERPL BCP-MCNC: 4 G/DL
ALP SERPL-CCNC: 72 U/L
ALT SERPL W/O P-5'-P-CCNC: 17 U/L
ANION GAP SERPL CALC-SCNC: 8 MMOL/L
AST SERPL-CCNC: 27 U/L
BASOPHILS # BLD AUTO: 0.04 K/UL
BASOPHILS NFR BLD: 0.8 %
BILIRUB SERPL-MCNC: 0.6 MG/DL
BUN SERPL-MCNC: 17 MG/DL
CALCIUM SERPL-MCNC: 9.3 MG/DL
CHLORIDE SERPL-SCNC: 105 MMOL/L
CHOLEST SERPL-MCNC: 169 MG/DL
CHOLEST/HDLC SERPL: 2.1 {RATIO}
CO2 SERPL-SCNC: 29 MMOL/L
CREAT SERPL-MCNC: 0.9 MG/DL
DIFFERENTIAL METHOD: ABNORMAL
EOSINOPHIL # BLD AUTO: 0 K/UL
EOSINOPHIL NFR BLD: 0.6 %
ERYTHROCYTE [DISTWIDTH] IN BLOOD BY AUTOMATED COUNT: 13.9 %
EST. GFR  (AFRICAN AMERICAN): >60 ML/MIN/1.73 M^2
EST. GFR  (NON AFRICAN AMERICAN): >60 ML/MIN/1.73 M^2
GLUCOSE SERPL-MCNC: 87 MG/DL
HCT VFR BLD AUTO: 38 %
HDLC SERPL-MCNC: 80 MG/DL
HDLC SERPL: 47.3 %
HGB BLD-MCNC: 11.3 G/DL
IMM GRANULOCYTES # BLD AUTO: 0.01 K/UL
IMM GRANULOCYTES NFR BLD AUTO: 0.2 %
LDLC SERPL CALC-MCNC: 81 MG/DL
LYMPHOCYTES # BLD AUTO: 2.4 K/UL
LYMPHOCYTES NFR BLD: 46.5 %
MCH RBC QN AUTO: 28.1 PG
MCHC RBC AUTO-ENTMCNC: 29.7 G/DL
MCV RBC AUTO: 95 FL
MONOCYTES # BLD AUTO: 0.6 K/UL
MONOCYTES NFR BLD: 11.2 %
NEUTROPHILS # BLD AUTO: 2.1 K/UL
NEUTROPHILS NFR BLD: 40.7 %
NONHDLC SERPL-MCNC: 89 MG/DL
NRBC BLD-RTO: 0 /100 WBC
PLATELET # BLD AUTO: 320 K/UL
PMV BLD AUTO: 11.4 FL
POTASSIUM SERPL-SCNC: 4.3 MMOL/L
PROT SERPL-MCNC: 7.7 G/DL
RBC # BLD AUTO: 4.02 M/UL
SODIUM SERPL-SCNC: 142 MMOL/L
T4 FREE SERPL-MCNC: 0.72 NG/DL
TRIGL SERPL-MCNC: 40 MG/DL
TSH SERPL DL<=0.005 MIU/L-ACNC: 35.73 UIU/ML
WBC # BLD AUTO: 5.07 K/UL

## 2018-12-21 PROCEDURE — 90686 IIV4 VACC NO PRSV 0.5 ML IM: CPT | Mod: S$GLB,,, | Performed by: FAMILY MEDICINE

## 2018-12-21 PROCEDURE — 3074F SYST BP LT 130 MM HG: CPT | Mod: CPTII,S$GLB,, | Performed by: FAMILY MEDICINE

## 2018-12-21 PROCEDURE — 90715 TDAP VACCINE 7 YRS/> IM: CPT | Mod: S$GLB,,, | Performed by: FAMILY MEDICINE

## 2018-12-21 PROCEDURE — 99999 PR PBB SHADOW E&M-EST. PATIENT-LVL IV: CPT | Mod: PBBFAC,,, | Performed by: FAMILY MEDICINE

## 2018-12-21 PROCEDURE — 93000 ELECTROCARDIOGRAM COMPLETE: CPT | Mod: S$GLB,,, | Performed by: INTERNAL MEDICINE

## 2018-12-21 PROCEDURE — 90472 IMMUNIZATION ADMIN EACH ADD: CPT | Mod: S$GLB,,, | Performed by: FAMILY MEDICINE

## 2018-12-21 PROCEDURE — 84443 ASSAY THYROID STIM HORMONE: CPT

## 2018-12-21 PROCEDURE — 90471 IMMUNIZATION ADMIN: CPT | Mod: S$GLB,,, | Performed by: FAMILY MEDICINE

## 2018-12-21 PROCEDURE — 85025 COMPLETE CBC W/AUTO DIFF WBC: CPT

## 2018-12-21 PROCEDURE — 36415 COLL VENOUS BLD VENIPUNCTURE: CPT | Mod: PO

## 2018-12-21 PROCEDURE — 84439 ASSAY OF FREE THYROXINE: CPT

## 2018-12-21 PROCEDURE — 99396 PREV VISIT EST AGE 40-64: CPT | Mod: 25,S$GLB,, | Performed by: FAMILY MEDICINE

## 2018-12-21 PROCEDURE — 3079F DIAST BP 80-89 MM HG: CPT | Mod: CPTII,S$GLB,, | Performed by: FAMILY MEDICINE

## 2018-12-21 PROCEDURE — 80061 LIPID PANEL: CPT

## 2018-12-21 PROCEDURE — 80053 COMPREHEN METABOLIC PANEL: CPT

## 2018-12-21 RX ORDER — CARVEDILOL 6.25 MG/1
6.25 TABLET ORAL 2 TIMES DAILY WITH MEALS
Qty: 60 TABLET | Refills: 11 | Status: SHIPPED | OUTPATIENT
Start: 2018-12-21 | End: 2019-12-26

## 2018-12-21 RX ORDER — ESTRADIOL 2 MG/1
TABLET ORAL
Refills: 0 | COMMUNITY
Start: 2018-12-06 | End: 2022-06-06

## 2018-12-21 RX ORDER — CARVEDILOL 6.25 MG/1
6.25 TABLET ORAL 2 TIMES DAILY WITH MEALS
Qty: 60 TABLET | Refills: 11 | Status: SHIPPED | OUTPATIENT
Start: 2018-12-21 | End: 2018-12-21 | Stop reason: SDUPTHER

## 2018-12-21 NOTE — PROGRESS NOTES
Subjective:       Patient ID: Gaviota Frazier is a 58 y.o. female.    Chief Complaint: Annual Exam    58-year-old  female patient with Patient Active Problem List:     HTN (hypertension)     GERD (gastroesophageal reflux disease)     Hypothyroidism (acquired)     Antinuclear factor positive     Neutropenia     Special screening for malignant neoplasms, colon  Here for routine annual physicals.  Patient reports that she has been having occasional leg cramps and has been taking carvedilol 12.5 mg once daily.  Patient reported that she has been to dentist and secondary to severe pain and anxiety her blood pressure was elevated during the visit but normally maintains good blood pressure.  Has been having occasional leg cramps  Patient has been drinking moderate amount of fluids  Has not been exercising lately  Denies any chest pain or difficulty breathing, trouble with swallowing, fatigue changes in bowel movements or appetite  Patient continues to have hot flashes and has been on Estrace 2 mg daily, Has appointment with her gynecologist   at [facility] for mammogram today      Review of Systems   Constitutional: Negative for activity change and fatigue.   HENT: Positive for rhinorrhea. Negative for hearing loss and trouble swallowing.    Eyes: Negative for discharge and visual disturbance.   Respiratory: Negative for chest tightness, shortness of breath and wheezing.    Cardiovascular: Negative for chest pain, palpitations and leg swelling.   Gastrointestinal: Negative for abdominal pain, blood in stool, constipation, diarrhea, nausea and vomiting.   Endocrine: Negative for polydipsia.   Genitourinary: Negative for difficulty urinating, dysuria, hematuria and menstrual problem.   Musculoskeletal: Positive for myalgias. Negative for arthralgias, joint swelling and neck pain.   Skin: Negative for rash.   Neurological: Negative for weakness, light-headedness and headaches.   Psychiatric/Behavioral:  "Negative for confusion, dysphoric mood and sleep disturbance.         /84 (BP Location: Left arm, Patient Position: Sitting)   Pulse 68   Temp 97.8 °F (36.6 °C) (Oral)   Ht 5' 7" (1.702 m)   Wt 76.2 kg (167 lb 15.9 oz)   SpO2 99%   BMI 26.31 kg/m²   Objective:      Physical Exam   Constitutional: She is oriented to person, place, and time. She appears well-developed and well-nourished.   HENT:   Head: Normocephalic and atraumatic.   Mouth/Throat: Oropharynx is clear and moist.   Cardiovascular: Normal rate, regular rhythm and normal heart sounds.   No murmur heard.  Pulmonary/Chest: Effort normal and breath sounds normal. She has no wheezes.   Abdominal: Soft. Bowel sounds are normal. There is no tenderness.   Musculoskeletal: She exhibits no edema.   Neurological: She is alert and oriented to person, place, and time.   Skin: Skin is warm and dry. No rash noted.   Psychiatric: She has a normal mood and affect.         Assessment:       1. Routine general medical examination at a health care facility    2. Essential hypertension    3. Hypothyroidism (acquired)    4. Gastroesophageal reflux disease, esophagitis presence not specified    5. Neutropenia, unspecified type    6. Need for diphtheria-tetanus-pertussis (Tdap) vaccine        Plan:   Routine general medical examination at a health care facility  -     CBC auto differential; Future; Expected date: 12/21/2018  -     Comprehensive metabolic panel; Future; Expected date: 12/21/2018  -     Lipid panel; Future; Expected date: 12/21/2018  -     TSH; Future; Expected date: 12/21/2018  -     Urinalysis; Future; Expected date: 12/21/2018  Vital signs stable today.  Clinical exam stable.  Encouraged to start lifestyle modifications with low-fat and low-cholesterol diet and exercise 30 min daily  Patient is scheduled to get mammogram at [facility] today  Tetanus booster and flu shot given today    Essential hypertension  -     Comprehensive metabolic panel; " Future; Expected date: 12/21/2018  -     Lipid panel; Future; Expected date: 12/21/2018  -     TSH; Future; Expected date: 12/21/2018  -     Urinalysis; Future; Expected date: 12/21/2018  -     EKG 12-lead; Future  Blood pressure is stable today currently on carvedilol 12.5 mg daily, will start on 6.25 mg to be taken twice daily  Patient seems to be at low risk for the dental extractions and likely anxiety and pain could be the reason for elevated blood pressure at dental clinic  Restrict salt intake    Hypothyroidism (acquired)  -     TSH; Future; Expected date: 12/21/2018  Status post thyroidectomy currently on levothyroxine 100 mcg p.o. daily    Gastroesophageal reflux disease, esophagitis presence not specified-stable with diet changes    Neutropenia, unspecified type-will recheck CBC today    Need for diphtheria-tetanus-pertussis (Tdap) vaccine  -     (In Office Administered) Tdap Vaccine    Other orders  -     Cancel: Mammo Digital Screening Bilat; Future; Expected date: 12/21/2018  -     Discontinue: carvedilol (COREG) 6.25 MG tablet; Take 1 tablet (6.25 mg total) by mouth 2 (two) times daily with meals.  Dispense: 60 tablet; Refill: 11  -     carvedilol (COREG) 6.25 MG tablet; Take 1 tablet (6.25 mg total) by mouth 2 (two) times daily with meals.  Dispense: 60 tablet; Refill: 11

## 2018-12-23 DIAGNOSIS — E03.9 HYPOTHYROIDISM (ACQUIRED): Primary | ICD-10-CM

## 2018-12-23 RX ORDER — LEVOTHYROXINE SODIUM 112 UG/1
112 TABLET ORAL DAILY
Qty: 30 TABLET | Refills: 11 | Status: SHIPPED | OUTPATIENT
Start: 2018-12-23 | End: 2020-01-13 | Stop reason: SDUPTHER

## 2018-12-26 ENCOUNTER — TELEPHONE (OUTPATIENT)
Dept: INTERNAL MEDICINE | Facility: CLINIC | Age: 58
End: 2018-12-26

## 2018-12-26 NOTE — TELEPHONE ENCOUNTER
----- Message from Gustavo Garza sent at 12/26/2018  8:15 AM CST -----  Contact: self [phone]  Would like follow-up with nurse regarding thyroid medication. Please call back at [phone].  Md [place]ony

## 2018-12-26 NOTE — TELEPHONE ENCOUNTER
Spoke with pt.She informed me she was not taking her thyroid medication as prescribed. She was only taking half a tablet. She never picked up her new rx for her Synthroid because she wasn't sure if she wanted to increase her dosage. I advised pt an increase in the dosage is good for her because it will bring her TSH levels down .Pt complained of feeling out of whack. I informed pt she needs to be taking this medication daily to help lower her TSH. I informed pt with her number being that high she is going to feel out of whack.She was advised to take her 112 mcg synthroid as prescribed and to follow up on her TSH levels on 1/21/2019. Pt acknowledged understanding and said she will  her new prescription today and begin taking it. Pt was thankful for the call back. Call ended well.

## 2019-01-02 ENCOUNTER — TELEPHONE (OUTPATIENT)
Dept: INTERNAL MEDICINE | Facility: CLINIC | Age: 59
End: 2019-01-02

## 2019-01-02 NOTE — TELEPHONE ENCOUNTER
----- Message from Kristan Chung sent at 1/2/2019  8:22 AM CST -----  Contact: pt  Please call pt @ [phone], pt have some questions for nurse

## 2019-01-02 NOTE — TELEPHONE ENCOUNTER
Spoke with pt. She asked me to contact her dentist office to get form for her dental procedure clearance. I told pt I would give them a call and ask them to fax form to our office for  to fill out. I called [facility] aviles at [phone]. Kandis said she will send the form over. Call ended well.

## 2019-03-19 DIAGNOSIS — E78.2 MIXED HYPERLIPIDEMIA: ICD-10-CM

## 2019-03-19 RX ORDER — SIMVASTATIN 20 MG/1
20 TABLET, FILM COATED ORAL NIGHTLY
Qty: 90 TABLET | Refills: 3 | Status: SHIPPED | OUTPATIENT
Start: 2019-03-19 | End: 2020-05-14

## 2019-03-19 NOTE — TELEPHONE ENCOUNTER
----- Message from Joseline Zhao sent at 3/19/2019 12:42 PM CDT -----  Contact: Patient   Type:  RX Refill Request    Who Called: Gaviota  Refill or New Rx: Refill   RX Name and Strength: She is not sure of the name of medication but she do know that it is a 20 mg and it's treatment for her Cholesterol  How is the patient currently taking it? (ex. 1XDay): 1x day  Is this a 30 day or 90 day RX: 90 if possible  Preferred Pharmacy with phone number: [facility] pharmacy on [address] and [address]  Ph: [phone]  Local or Mail Order: Local  Ordering Provider: Dr. Malave  Would the patient rather a call back or a response via [facility]? Call back   Best Call Back Number: Please call her at [phone]  Additional Information: n/a

## 2019-09-27 ENCOUNTER — PATIENT OUTREACH (OUTPATIENT)
Dept: ADMINISTRATIVE | Facility: HOSPITAL | Age: 59
End: 2019-09-27

## 2019-12-01 ENCOUNTER — PATIENT OUTREACH (OUTPATIENT)
Dept: ADMINISTRATIVE | Facility: OTHER | Age: 59
End: 2019-12-01

## 2019-12-01 DIAGNOSIS — Z12.31 ENCOUNTER FOR SCREENING MAMMOGRAM FOR MALIGNANT NEOPLASM OF BREAST: Primary | ICD-10-CM

## 2019-12-02 ENCOUNTER — OFFICE VISIT (OUTPATIENT)
Dept: ENDOCRINOLOGY | Facility: CLINIC | Age: 59
End: 2019-12-02
Payer: COMMERCIAL

## 2019-12-02 VITALS
HEIGHT: 67 IN | WEIGHT: 176.38 LBS | DIASTOLIC BLOOD PRESSURE: 100 MMHG | SYSTOLIC BLOOD PRESSURE: 150 MMHG | HEART RATE: 74 BPM | BODY MASS INDEX: 27.68 KG/M2

## 2019-12-02 DIAGNOSIS — I10 ESSENTIAL HYPERTENSION: ICD-10-CM

## 2019-12-02 DIAGNOSIS — E03.9 HYPOTHYROIDISM, UNSPECIFIED TYPE: Primary | ICD-10-CM

## 2019-12-02 PROCEDURE — 99999 PR PBB SHADOW E&M-EST. PATIENT-LVL III: CPT | Mod: PBBFAC,,, | Performed by: INTERNAL MEDICINE

## 2019-12-02 PROCEDURE — 99999 PR PBB SHADOW E&M-EST. PATIENT-LVL III: ICD-10-PCS | Mod: PBBFAC,,, | Performed by: INTERNAL MEDICINE

## 2019-12-02 PROCEDURE — 3008F BODY MASS INDEX DOCD: CPT | Mod: CPTII,S$GLB,, | Performed by: INTERNAL MEDICINE

## 2019-12-02 PROCEDURE — 3077F SYST BP >= 140 MM HG: CPT | Mod: CPTII,S$GLB,, | Performed by: INTERNAL MEDICINE

## 2019-12-02 PROCEDURE — 3008F PR BODY MASS INDEX (BMI) DOCUMENTED: ICD-10-PCS | Mod: CPTII,S$GLB,, | Performed by: INTERNAL MEDICINE

## 2019-12-02 PROCEDURE — 99214 OFFICE O/P EST MOD 30 MIN: CPT | Mod: S$GLB,,, | Performed by: INTERNAL MEDICINE

## 2019-12-02 PROCEDURE — 3080F PR MOST RECENT DIASTOLIC BLOOD PRESSURE >= 90 MM HG: ICD-10-PCS | Mod: CPTII,S$GLB,, | Performed by: INTERNAL MEDICINE

## 2019-12-02 PROCEDURE — 99214 PR OFFICE/OUTPT VISIT, EST, LEVL IV, 30-39 MIN: ICD-10-PCS | Mod: S$GLB,,, | Performed by: INTERNAL MEDICINE

## 2019-12-02 PROCEDURE — 3080F DIAST BP >= 90 MM HG: CPT | Mod: CPTII,S$GLB,, | Performed by: INTERNAL MEDICINE

## 2019-12-02 PROCEDURE — 3077F PR MOST RECENT SYSTOLIC BLOOD PRESSURE >= 140 MM HG: ICD-10-PCS | Mod: CPTII,S$GLB,, | Performed by: INTERNAL MEDICINE

## 2019-12-02 NOTE — PATIENT INSTRUCTIONS
TSH check in 2 weeks.    For now continue levothyroxine 112 mcg daily.    don't miss any doses.    I will let you know through the portal if we need to make changes in 2 weeks.    Increase coreg to 12.5 mg twice a day and follow up with Dr. Malave

## 2019-12-02 NOTE — ASSESSMENT & PLAN NOTE
Has only been consistently taking LT4 112 mcg for 1 month.  Will check TSH in 2 months and adjust as needed.  She is clinically euthyroid.  Stressed importance of medication compliance and she agrees to take medication consistently.

## 2019-12-02 NOTE — ASSESSMENT & PLAN NOTE
Uncontrolled HTN since reducing coreg.  HR can tolerate increase thus will have her go back to previous dose of coreg 12.5 mg BID.  She will monitor home BP and follow up with PCP

## 2019-12-13 ENCOUNTER — TELEPHONE (OUTPATIENT)
Dept: ADMINISTRATIVE | Facility: HOSPITAL | Age: 59
End: 2019-12-13

## 2019-12-13 NOTE — TELEPHONE ENCOUNTER
Attempted to Contact patient to schedule yearly visit with PCP Dr Princess Malave. Left voicemail for patient to call back to schedule yearly PCP visit. PDatani

## 2019-12-26 RX ORDER — CARVEDILOL 6.25 MG/1
TABLET ORAL
Qty: 60 TABLET | Refills: 0 | Status: SHIPPED | OUTPATIENT
Start: 2019-12-26 | End: 2020-01-10 | Stop reason: DRUGHIGH

## 2020-01-10 ENCOUNTER — OFFICE VISIT (OUTPATIENT)
Dept: INTERNAL MEDICINE | Facility: CLINIC | Age: 60
End: 2020-01-10
Payer: COMMERCIAL

## 2020-01-10 ENCOUNTER — LAB VISIT (OUTPATIENT)
Dept: LAB | Facility: HOSPITAL | Age: 60
End: 2020-01-10
Attending: FAMILY MEDICINE
Payer: COMMERCIAL

## 2020-01-10 ENCOUNTER — LAB VISIT (OUTPATIENT)
Dept: LAB | Facility: HOSPITAL | Age: 60
End: 2020-01-10
Payer: COMMERCIAL

## 2020-01-10 ENCOUNTER — CLINICAL SUPPORT (OUTPATIENT)
Dept: CARDIOLOGY | Facility: CLINIC | Age: 60
End: 2020-01-10
Payer: COMMERCIAL

## 2020-01-10 VITALS
RESPIRATION RATE: 18 BRPM | BODY MASS INDEX: 27.09 KG/M2 | HEART RATE: 60 BPM | OXYGEN SATURATION: 99 % | TEMPERATURE: 98 F | SYSTOLIC BLOOD PRESSURE: 152 MMHG | HEIGHT: 67 IN | WEIGHT: 172.63 LBS | DIASTOLIC BLOOD PRESSURE: 100 MMHG

## 2020-01-10 DIAGNOSIS — E03.9 HYPOTHYROIDISM, UNSPECIFIED TYPE: ICD-10-CM

## 2020-01-10 DIAGNOSIS — F41.9 ANXIETY: ICD-10-CM

## 2020-01-10 DIAGNOSIS — K21.9 GASTROESOPHAGEAL REFLUX DISEASE, ESOPHAGITIS PRESENCE NOT SPECIFIED: ICD-10-CM

## 2020-01-10 DIAGNOSIS — N95.1 MENOPAUSAL SYNDROME (HOT FLASHES): ICD-10-CM

## 2020-01-10 DIAGNOSIS — I10 ESSENTIAL HYPERTENSION: ICD-10-CM

## 2020-01-10 DIAGNOSIS — E78.2 MIXED HYPERLIPIDEMIA: ICD-10-CM

## 2020-01-10 DIAGNOSIS — Z29.9 PREVENTIVE MEASURE: ICD-10-CM

## 2020-01-10 DIAGNOSIS — I10 ESSENTIAL HYPERTENSION: Primary | ICD-10-CM

## 2020-01-10 LAB
ALBUMIN SERPL BCP-MCNC: 3.7 G/DL (ref 3.5–5.2)
ALP SERPL-CCNC: 67 U/L (ref 55–135)
ALT SERPL W/O P-5'-P-CCNC: 19 U/L (ref 10–44)
ANION GAP SERPL CALC-SCNC: 6 MMOL/L (ref 8–16)
AST SERPL-CCNC: 19 U/L (ref 10–40)
BASOPHILS # BLD AUTO: 0.03 K/UL (ref 0–0.2)
BASOPHILS NFR BLD: 0.7 % (ref 0–1.9)
BILIRUB SERPL-MCNC: 0.5 MG/DL (ref 0.1–1)
BILIRUB UR QL STRIP: NEGATIVE
BUN SERPL-MCNC: 14 MG/DL (ref 6–20)
CALCIUM SERPL-MCNC: 9.3 MG/DL (ref 8.7–10.5)
CHLORIDE SERPL-SCNC: 107 MMOL/L (ref 95–110)
CHOLEST SERPL-MCNC: 205 MG/DL (ref 120–199)
CHOLEST/HDLC SERPL: 3.1 {RATIO} (ref 2–5)
CLARITY UR: CLEAR
CO2 SERPL-SCNC: 28 MMOL/L (ref 23–29)
COLOR UR: YELLOW
CREAT SERPL-MCNC: 0.8 MG/DL (ref 0.5–1.4)
DIFFERENTIAL METHOD: ABNORMAL
EOSINOPHIL # BLD AUTO: 0.1 K/UL (ref 0–0.5)
EOSINOPHIL NFR BLD: 1.1 % (ref 0–8)
ERYTHROCYTE [DISTWIDTH] IN BLOOD BY AUTOMATED COUNT: 12.8 % (ref 11.5–14.5)
EST. GFR  (AFRICAN AMERICAN): >60 ML/MIN/1.73 M^2
EST. GFR  (NON AFRICAN AMERICAN): >60 ML/MIN/1.73 M^2
GLUCOSE SERPL-MCNC: 80 MG/DL (ref 70–110)
GLUCOSE UR QL STRIP: NEGATIVE
HCT VFR BLD AUTO: 41.9 % (ref 37–48.5)
HDLC SERPL-MCNC: 67 MG/DL (ref 40–75)
HDLC SERPL: 32.7 % (ref 20–50)
HGB BLD-MCNC: 12.7 G/DL (ref 12–16)
HGB UR QL STRIP: ABNORMAL
IMM GRANULOCYTES # BLD AUTO: 0 K/UL (ref 0–0.04)
IMM GRANULOCYTES NFR BLD AUTO: 0 % (ref 0–0.5)
KETONES UR QL STRIP: NEGATIVE
LDLC SERPL CALC-MCNC: 124 MG/DL (ref 63–159)
LEUKOCYTE ESTERASE UR QL STRIP: NEGATIVE
LYMPHOCYTES # BLD AUTO: 2.2 K/UL (ref 1–4.8)
LYMPHOCYTES NFR BLD: 50.5 % (ref 18–48)
MCH RBC QN AUTO: 29.4 PG (ref 27–31)
MCHC RBC AUTO-ENTMCNC: 30.3 G/DL (ref 32–36)
MCV RBC AUTO: 97 FL (ref 82–98)
MONOCYTES # BLD AUTO: 0.5 K/UL (ref 0.3–1)
MONOCYTES NFR BLD: 11.4 % (ref 4–15)
NEUTROPHILS # BLD AUTO: 1.6 K/UL (ref 1.8–7.7)
NEUTROPHILS NFR BLD: 36.3 % (ref 38–73)
NITRITE UR QL STRIP: NEGATIVE
NONHDLC SERPL-MCNC: 138 MG/DL
NRBC BLD-RTO: 0 /100 WBC
PH UR STRIP: 6 [PH] (ref 5–8)
PLATELET # BLD AUTO: 255 K/UL (ref 150–350)
PMV BLD AUTO: 11.7 FL (ref 9.2–12.9)
POTASSIUM SERPL-SCNC: 4.5 MMOL/L (ref 3.5–5.1)
PROT SERPL-MCNC: 7.2 G/DL (ref 6–8.4)
PROT UR QL STRIP: NEGATIVE
RBC # BLD AUTO: 4.32 M/UL (ref 4–5.4)
SODIUM SERPL-SCNC: 141 MMOL/L (ref 136–145)
SP GR UR STRIP: 1.02 (ref 1–1.03)
TRIGL SERPL-MCNC: 70 MG/DL (ref 30–150)
URN SPEC COLLECT METH UR: ABNORMAL
WBC # BLD AUTO: 4.38 K/UL (ref 3.9–12.7)

## 2020-01-10 PROCEDURE — 3008F BODY MASS INDEX DOCD: CPT | Mod: CPTII,S$GLB,, | Performed by: FAMILY MEDICINE

## 2020-01-10 PROCEDURE — 93010 EKG 12-LEAD: ICD-10-PCS | Mod: S$GLB,,, | Performed by: INTERNAL MEDICINE

## 2020-01-10 PROCEDURE — 3008F PR BODY MASS INDEX (BMI) DOCUMENTED: ICD-10-PCS | Mod: CPTII,S$GLB,, | Performed by: FAMILY MEDICINE

## 2020-01-10 PROCEDURE — 3077F PR MOST RECENT SYSTOLIC BLOOD PRESSURE >= 140 MM HG: ICD-10-PCS | Mod: CPTII,S$GLB,, | Performed by: FAMILY MEDICINE

## 2020-01-10 PROCEDURE — 3077F SYST BP >= 140 MM HG: CPT | Mod: CPTII,S$GLB,, | Performed by: FAMILY MEDICINE

## 2020-01-10 PROCEDURE — 99999 PR PBB SHADOW E&M-EST. PATIENT-LVL III: ICD-10-PCS | Mod: PBBFAC,,, | Performed by: FAMILY MEDICINE

## 2020-01-10 PROCEDURE — 99999 PR PBB SHADOW E&M-EST. PATIENT-LVL III: CPT | Mod: PBBFAC,,, | Performed by: FAMILY MEDICINE

## 2020-01-10 PROCEDURE — 93005 ELECTROCARDIOGRAM TRACING: CPT | Mod: S$GLB,,, | Performed by: FAMILY MEDICINE

## 2020-01-10 PROCEDURE — 3080F DIAST BP >= 90 MM HG: CPT | Mod: CPTII,S$GLB,, | Performed by: FAMILY MEDICINE

## 2020-01-10 PROCEDURE — 85025 COMPLETE CBC W/AUTO DIFF WBC: CPT

## 2020-01-10 PROCEDURE — 36415 COLL VENOUS BLD VENIPUNCTURE: CPT

## 2020-01-10 PROCEDURE — 81003 URINALYSIS AUTO W/O SCOPE: CPT

## 2020-01-10 PROCEDURE — 99214 OFFICE O/P EST MOD 30 MIN: CPT | Mod: S$GLB,,, | Performed by: FAMILY MEDICINE

## 2020-01-10 PROCEDURE — 99214 PR OFFICE/OUTPT VISIT, EST, LEVL IV, 30-39 MIN: ICD-10-PCS | Mod: S$GLB,,, | Performed by: FAMILY MEDICINE

## 2020-01-10 PROCEDURE — 80061 LIPID PANEL: CPT

## 2020-01-10 PROCEDURE — 3080F PR MOST RECENT DIASTOLIC BLOOD PRESSURE >= 90 MM HG: ICD-10-PCS | Mod: CPTII,S$GLB,, | Performed by: FAMILY MEDICINE

## 2020-01-10 PROCEDURE — 93010 ELECTROCARDIOGRAM REPORT: CPT | Mod: S$GLB,,, | Performed by: INTERNAL MEDICINE

## 2020-01-10 PROCEDURE — 93005 EKG 12-LEAD: ICD-10-PCS | Mod: S$GLB,,, | Performed by: FAMILY MEDICINE

## 2020-01-10 PROCEDURE — 80053 COMPREHEN METABOLIC PANEL: CPT

## 2020-01-10 PROCEDURE — 84443 ASSAY THYROID STIM HORMONE: CPT

## 2020-01-10 RX ORDER — HYDROCHLOROTHIAZIDE 25 MG/1
25 TABLET ORAL DAILY
Qty: 30 TABLET | Refills: 0 | Status: SHIPPED | OUTPATIENT
Start: 2020-01-10 | End: 2020-03-20

## 2020-01-10 RX ORDER — HYDROCHLOROTHIAZIDE 25 MG/1
25 TABLET ORAL DAILY
Qty: 30 TABLET | Refills: 1 | Status: SHIPPED | OUTPATIENT
Start: 2020-01-10 | End: 2020-01-10 | Stop reason: SDUPTHER

## 2020-01-10 RX ORDER — CARVEDILOL 12.5 MG/1
12.5 TABLET ORAL 2 TIMES DAILY WITH MEALS
Qty: 60 TABLET | Refills: 0 | Status: SHIPPED | OUTPATIENT
Start: 2020-01-10 | End: 2020-05-12 | Stop reason: SDUPTHER

## 2020-01-10 RX ORDER — CARVEDILOL 12.5 MG/1
TABLET ORAL
COMMUNITY
Start: 2016-11-09 | End: 2020-01-10 | Stop reason: SDUPTHER

## 2020-01-10 NOTE — PROGRESS NOTES
Subjective:       Patient ID: Gaviota Frazier is a 59 y.o. female.    Chief Complaint: Annual Exam    59-year-old  female patient with Patient Active Problem List:     Essential hypertension     GERD (gastroesophageal reflux disease)     Hypothyroidism     Antinuclear factor positive     Special screening for malignant neoplasms, colon  Reports that she has been stressed out lately, has been taking her blood pressure medication, but has been having fluctuating blood pressures lately.  Denies any depression but, has not been going to work for the past 10 days, as she has several hours, patient was advised that she will need excuse from the physician.   Has been having difficulty sleeping secondary to anxiety.   Taking her thyroid medication regularly  Denies any chest pain or shortness of breath or palpitations, vision disturbances, tingling or numbness sensation to extremities    Review of Systems   Constitutional: Negative for activity change, fatigue and unexpected weight change.   HENT: Negative for hearing loss, rhinorrhea and trouble swallowing.    Eyes: Negative for discharge and visual disturbance.   Respiratory: Negative for chest tightness, shortness of breath and wheezing.    Cardiovascular: Negative for chest pain, palpitations and leg swelling.   Gastrointestinal: Negative for abdominal pain, blood in stool, constipation, diarrhea, nausea and vomiting.   Endocrine: Negative for polydipsia and polyuria.   Genitourinary: Negative for difficulty urinating, dysuria, hematuria and menstrual problem.   Musculoskeletal: Negative for arthralgias, joint swelling, myalgias and neck pain.   Skin: Negative for rash.   Neurological: Negative for weakness, light-headedness and headaches.   Psychiatric/Behavioral: Positive for sleep disturbance. Negative for confusion and dysphoric mood. The patient is nervous/anxious.          BP (!) 152/100 (BP Location: Right arm, Patient Position: Sitting, BP  "Method: Medium (Manual))   Pulse 60   Temp 97.8 °F (36.6 °C) (Tympanic)   Resp 18   Ht 5' 7" (1.702 m)   Wt 78.3 kg (172 lb 9.9 oz)   SpO2 99%   BMI 27.04 kg/m²   Objective:      Physical Exam   Constitutional: She is oriented to person, place, and time. She appears well-developed and well-nourished.   HENT:   Head: Normocephalic and atraumatic.   Mouth/Throat: Oropharynx is clear and moist.   Cardiovascular: Normal rate, regular rhythm and normal heart sounds.   No murmur heard.  Pulmonary/Chest: Effort normal and breath sounds normal. She has no wheezes.   Abdominal: Soft. Bowel sounds are normal. There is no tenderness.   Musculoskeletal: She exhibits no edema.   Neurological: She is alert and oriented to person, place, and time. No cranial nerve deficit.   Skin: Skin is warm and dry. No rash noted.   Psychiatric: She has a normal mood and affect.         Assessment/Plan:   1. Essential hypertension  - Comprehensive metabolic panel; Future  - Lipid panel; Future  - TSH; Future  - EKG 12-lead; Future  - Urinalysis; Future  - hydroCHLOROthiazide (HYDRODIURIL) 25 MG tablet; Take 1 tablet (25 mg total) by mouth once daily.  Dispense: 30 tablet; Refill: 0  Blood pressure elevated today, will start on hydrochlorothiazide 25 mg, refill will be given on carvedilol 12.5 mg twice daily  Follow-up in 1 week for blood pressure check and labs for physicals  Restrict salt intake and eat low-fat and low-cholesterol diet and avoid stress  Work excuse will be given for 10 days follow-up with me in 1 week    2. Preventive measure  - CBC auto differential; Future  - Comprehensive metabolic panel; Future  - Lipid panel; Future  - TSH; Future  - Urinalysis; Future  Will check complete fasting labs today and follow-up in 1 week  Refuses mammogram    3. Hypothyroidism, unspecified type  - TSH; Future  Currently on levothyroxine 112 mcg p.o. daily    4. Gastroesophageal reflux disease, esophagitis presence not specified  Stable " with diet changes    5. Anxiety  Encouraged to avoid stress, refuses to get started on anxiety medication at this time    6. Menopausal syndrome (hot flashes)  Stable on Estrace     Secondary to hyperlipidemia currently on simvastatin 20 mg daily

## 2020-01-11 ENCOUNTER — PATIENT MESSAGE (OUTPATIENT)
Dept: ENDOCRINOLOGY | Facility: CLINIC | Age: 60
End: 2020-01-11

## 2020-01-11 LAB — TSH SERPL DL<=0.005 MIU/L-ACNC: 2.34 UIU/ML (ref 0.4–4)

## 2020-01-13 ENCOUNTER — PATIENT MESSAGE (OUTPATIENT)
Dept: INTERNAL MEDICINE | Facility: CLINIC | Age: 60
End: 2020-01-13

## 2020-01-13 ENCOUNTER — PATIENT MESSAGE (OUTPATIENT)
Dept: ENDOCRINOLOGY | Facility: CLINIC | Age: 60
End: 2020-01-13

## 2020-01-13 DIAGNOSIS — E03.9 HYPOTHYROIDISM (ACQUIRED): ICD-10-CM

## 2020-01-13 DIAGNOSIS — E03.9 HYPOTHYROIDISM, UNSPECIFIED TYPE: Primary | ICD-10-CM

## 2020-01-13 RX ORDER — LEVOTHYROXINE SODIUM 112 UG/1
112 TABLET ORAL DAILY
Qty: 90 TABLET | Refills: 3 | Status: SHIPPED | OUTPATIENT
Start: 2020-01-13 | End: 2020-01-15 | Stop reason: SDUPTHER

## 2020-01-13 NOTE — TELEPHONE ENCOUNTER
TSH normal while on LT4 112 mcg daily x 2 months.  Will continue current dose    Lab Results   Component Value Date    TSH 2.336 01/10/2020

## 2020-01-15 DIAGNOSIS — E03.9 HYPOTHYROIDISM (ACQUIRED): ICD-10-CM

## 2020-01-15 RX ORDER — LEVOTHYROXINE SODIUM 112 UG/1
112 TABLET ORAL DAILY
Qty: 90 TABLET | Refills: 3 | Status: SHIPPED | OUTPATIENT
Start: 2020-01-15 | End: 2020-03-09 | Stop reason: SDUPTHER

## 2020-03-09 DIAGNOSIS — E03.9 HYPOTHYROIDISM (ACQUIRED): ICD-10-CM

## 2020-03-09 RX ORDER — LEVOTHYROXINE SODIUM 112 UG/1
112 TABLET ORAL DAILY
Qty: 90 TABLET | Refills: 3 | Status: SHIPPED | OUTPATIENT
Start: 2020-03-09 | End: 2021-03-18 | Stop reason: SDUPTHER

## 2020-03-20 DIAGNOSIS — I10 ESSENTIAL HYPERTENSION: ICD-10-CM

## 2020-03-20 RX ORDER — CARVEDILOL 6.25 MG/1
TABLET ORAL
Qty: 60 TABLET | Refills: 0 | OUTPATIENT
Start: 2020-03-20

## 2020-03-20 RX ORDER — HYDROCHLOROTHIAZIDE 25 MG/1
TABLET ORAL
Qty: 30 TABLET | Refills: 1 | Status: SHIPPED | OUTPATIENT
Start: 2020-03-20 | End: 2021-03-18

## 2020-05-13 RX ORDER — CARVEDILOL 12.5 MG/1
12.5 TABLET ORAL 2 TIMES DAILY WITH MEALS
Qty: 60 TABLET | Refills: 3 | Status: SHIPPED | OUTPATIENT
Start: 2020-05-13 | End: 2021-02-11 | Stop reason: SDUPTHER

## 2020-05-14 DIAGNOSIS — E78.2 MIXED HYPERLIPIDEMIA: ICD-10-CM

## 2020-05-14 RX ORDER — SIMVASTATIN 20 MG/1
TABLET, FILM COATED ORAL
Qty: 90 TABLET | Refills: 2 | Status: SHIPPED | OUTPATIENT
Start: 2020-05-14 | End: 2020-12-02

## 2020-08-26 ENCOUNTER — PATIENT OUTREACH (OUTPATIENT)
Dept: ADMINISTRATIVE | Facility: HOSPITAL | Age: 60
End: 2020-08-26

## 2020-08-26 NOTE — PROGRESS NOTES
L/M for pt to call office for Mammogram       Alma BAL LPN Care Coordinator  Care Coordination Department  [facility]  [phone]

## 2020-09-04 DIAGNOSIS — Z12.39 BREAST CANCER SCREENING: ICD-10-CM

## 2020-09-21 ENCOUNTER — PATIENT OUTREACH (OUTPATIENT)
Dept: ADMINISTRATIVE | Facility: HOSPITAL | Age: 60
End: 2020-09-21

## 2020-09-21 NOTE — PROGRESS NOTES
HTN REPORT: I spoke to pt and gave her a history of what HM is. Educated her on over due HM. Pt will take a home reading an call me back with the results. Pt will also call me back to get her established with a new provider when she is ready.

## 2020-12-23 ENCOUNTER — PATIENT OUTREACH (OUTPATIENT)
Dept: ADMINISTRATIVE | Facility: HOSPITAL | Age: 60
End: 2020-12-23

## 2021-02-11 ENCOUNTER — PATIENT OUTREACH (OUTPATIENT)
Dept: ADMINISTRATIVE | Facility: HOSPITAL | Age: 61
End: 2021-02-11

## 2021-02-11 RX ORDER — CARVEDILOL 12.5 MG/1
12.5 TABLET ORAL 2 TIMES DAILY WITH MEALS
Qty: 60 TABLET | Refills: 0 | Status: SHIPPED | OUTPATIENT
Start: 2021-02-11 | End: 2021-03-12 | Stop reason: SDUPTHER

## 2021-03-03 ENCOUNTER — PATIENT OUTREACH (OUTPATIENT)
Dept: ADMINISTRATIVE | Facility: HOSPITAL | Age: 61
End: 2021-03-03

## 2021-03-15 RX ORDER — CARVEDILOL 12.5 MG/1
12.5 TABLET ORAL 2 TIMES DAILY WITH MEALS
Qty: 60 TABLET | Refills: 0 | Status: SHIPPED | OUTPATIENT
Start: 2021-03-15 | End: 2021-03-18 | Stop reason: SDUPTHER

## 2021-03-18 ENCOUNTER — LAB VISIT (OUTPATIENT)
Dept: LAB | Facility: HOSPITAL | Age: 61
End: 2021-03-18
Attending: FAMILY MEDICINE
Payer: COMMERCIAL

## 2021-03-18 ENCOUNTER — OFFICE VISIT (OUTPATIENT)
Dept: INTERNAL MEDICINE | Facility: CLINIC | Age: 61
End: 2021-03-18
Payer: COMMERCIAL

## 2021-03-18 ENCOUNTER — LAB VISIT (OUTPATIENT)
Dept: LAB | Facility: HOSPITAL | Age: 61
End: 2021-03-18
Payer: COMMERCIAL

## 2021-03-18 VITALS
DIASTOLIC BLOOD PRESSURE: 84 MMHG | WEIGHT: 173.31 LBS | OXYGEN SATURATION: 99 % | TEMPERATURE: 98 F | HEIGHT: 67 IN | BODY MASS INDEX: 27.2 KG/M2 | HEART RATE: 56 BPM | SYSTOLIC BLOOD PRESSURE: 122 MMHG

## 2021-03-18 DIAGNOSIS — E78.2 MIXED HYPERLIPIDEMIA: ICD-10-CM

## 2021-03-18 DIAGNOSIS — Z11.4 SCREENING FOR HIV (HUMAN IMMUNODEFICIENCY VIRUS): ICD-10-CM

## 2021-03-18 DIAGNOSIS — I10 ESSENTIAL HYPERTENSION: ICD-10-CM

## 2021-03-18 DIAGNOSIS — Z00.00 ANNUAL PHYSICAL EXAM: ICD-10-CM

## 2021-03-18 DIAGNOSIS — Z00.00 ANNUAL PHYSICAL EXAM: Primary | ICD-10-CM

## 2021-03-18 DIAGNOSIS — E03.9 HYPOTHYROIDISM (ACQUIRED): ICD-10-CM

## 2021-03-18 DIAGNOSIS — K21.9 GASTROESOPHAGEAL REFLUX DISEASE WITHOUT ESOPHAGITIS: ICD-10-CM

## 2021-03-18 DIAGNOSIS — E03.9 ACQUIRED HYPOTHYROIDISM: ICD-10-CM

## 2021-03-18 PROBLEM — Z12.11 SPECIAL SCREENING FOR MALIGNANT NEOPLASMS, COLON: Status: RESOLVED | Noted: 2017-12-27 | Resolved: 2021-03-18

## 2021-03-18 LAB
ALBUMIN SERPL BCP-MCNC: 3.7 G/DL (ref 3.5–5.2)
ALP SERPL-CCNC: 56 U/L (ref 55–135)
ALT SERPL W/O P-5'-P-CCNC: 24 U/L (ref 10–44)
ANION GAP SERPL CALC-SCNC: 7 MMOL/L (ref 8–16)
AST SERPL-CCNC: 30 U/L (ref 10–40)
BILIRUB SERPL-MCNC: 0.4 MG/DL (ref 0.1–1)
BILIRUB UR QL STRIP: NEGATIVE
BUN SERPL-MCNC: 14 MG/DL (ref 6–20)
CALCIUM SERPL-MCNC: 8.4 MG/DL (ref 8.7–10.5)
CHLORIDE SERPL-SCNC: 108 MMOL/L (ref 95–110)
CHOLEST SERPL-MCNC: 157 MG/DL (ref 120–199)
CHOLEST/HDLC SERPL: 2.4 {RATIO} (ref 2–5)
CLARITY UR: CLEAR
CO2 SERPL-SCNC: 28 MMOL/L (ref 23–29)
COLOR UR: YELLOW
CREAT SERPL-MCNC: 1 MG/DL (ref 0.5–1.4)
ERYTHROCYTE [DISTWIDTH] IN BLOOD BY AUTOMATED COUNT: 13.1 % (ref 11.5–14.5)
EST. GFR  (AFRICAN AMERICAN): >60 ML/MIN/1.73 M^2
EST. GFR  (NON AFRICAN AMERICAN): >60 ML/MIN/1.73 M^2
GLUCOSE SERPL-MCNC: 86 MG/DL (ref 70–110)
GLUCOSE UR QL STRIP: NEGATIVE
HCT VFR BLD AUTO: 37.8 % (ref 37–48.5)
HDLC SERPL-MCNC: 65 MG/DL (ref 40–75)
HDLC SERPL: 41.4 % (ref 20–50)
HGB BLD-MCNC: 11.7 G/DL (ref 12–16)
HGB UR QL STRIP: NEGATIVE
KETONES UR QL STRIP: NEGATIVE
LDLC SERPL CALC-MCNC: 82.8 MG/DL (ref 63–159)
LEUKOCYTE ESTERASE UR QL STRIP: NEGATIVE
MCH RBC QN AUTO: 30.3 PG (ref 27–31)
MCHC RBC AUTO-ENTMCNC: 31 G/DL (ref 32–36)
MCV RBC AUTO: 98 FL (ref 82–98)
NITRITE UR QL STRIP: NEGATIVE
NONHDLC SERPL-MCNC: 92 MG/DL
PH UR STRIP: 6 [PH] (ref 5–8)
PLATELET # BLD AUTO: 253 K/UL (ref 150–350)
PMV BLD AUTO: 10.9 FL (ref 9.2–12.9)
POTASSIUM SERPL-SCNC: 4.6 MMOL/L (ref 3.5–5.1)
PROT SERPL-MCNC: 6.9 G/DL (ref 6–8.4)
PROT UR QL STRIP: NEGATIVE
RBC # BLD AUTO: 3.86 M/UL (ref 4–5.4)
SODIUM SERPL-SCNC: 143 MMOL/L (ref 136–145)
SP GR UR STRIP: >=1.03 (ref 1–1.03)
T4 FREE SERPL-MCNC: 1.11 NG/DL (ref 0.71–1.51)
TRIGL SERPL-MCNC: 46 MG/DL (ref 30–150)
TSH SERPL DL<=0.005 MIU/L-ACNC: 18.42 UIU/ML (ref 0.4–4)
URN SPEC COLLECT METH UR: ABNORMAL
WBC # BLD AUTO: 4.23 K/UL (ref 3.9–12.7)

## 2021-03-18 PROCEDURE — 80053 COMPREHEN METABOLIC PANEL: CPT | Performed by: FAMILY MEDICINE

## 2021-03-18 PROCEDURE — 84443 ASSAY THYROID STIM HORMONE: CPT | Performed by: FAMILY MEDICINE

## 2021-03-18 PROCEDURE — 84439 ASSAY OF FREE THYROXINE: CPT | Performed by: FAMILY MEDICINE

## 2021-03-18 PROCEDURE — 36415 COLL VENOUS BLD VENIPUNCTURE: CPT | Performed by: FAMILY MEDICINE

## 2021-03-18 PROCEDURE — 99999 PR PBB SHADOW E&M-EST. PATIENT-LVL IV: CPT | Mod: PBBFAC,,, | Performed by: FAMILY MEDICINE

## 2021-03-18 PROCEDURE — 85027 COMPLETE CBC AUTOMATED: CPT | Performed by: FAMILY MEDICINE

## 2021-03-18 PROCEDURE — 80061 LIPID PANEL: CPT | Performed by: FAMILY MEDICINE

## 2021-03-18 PROCEDURE — 3008F PR BODY MASS INDEX (BMI) DOCUMENTED: ICD-10-PCS | Mod: CPTII,S$GLB,, | Performed by: FAMILY MEDICINE

## 2021-03-18 PROCEDURE — 86703 HIV-1/HIV-2 1 RESULT ANTBDY: CPT | Performed by: FAMILY MEDICINE

## 2021-03-18 PROCEDURE — 3074F SYST BP LT 130 MM HG: CPT | Mod: CPTII,S$GLB,, | Performed by: FAMILY MEDICINE

## 2021-03-18 PROCEDURE — 99396 PREV VISIT EST AGE 40-64: CPT | Mod: S$GLB,,, | Performed by: FAMILY MEDICINE

## 2021-03-18 PROCEDURE — 3008F BODY MASS INDEX DOCD: CPT | Mod: CPTII,S$GLB,, | Performed by: FAMILY MEDICINE

## 2021-03-18 PROCEDURE — 99396 PR PREVENTIVE VISIT,EST,40-64: ICD-10-PCS | Mod: S$GLB,,, | Performed by: FAMILY MEDICINE

## 2021-03-18 PROCEDURE — 99999 PR PBB SHADOW E&M-EST. PATIENT-LVL IV: ICD-10-PCS | Mod: PBBFAC,,, | Performed by: FAMILY MEDICINE

## 2021-03-18 PROCEDURE — 3079F DIAST BP 80-89 MM HG: CPT | Mod: CPTII,S$GLB,, | Performed by: FAMILY MEDICINE

## 2021-03-18 PROCEDURE — 1126F PR PAIN SEVERITY QUANTIFIED, NO PAIN PRESENT: ICD-10-PCS | Mod: S$GLB,,, | Performed by: FAMILY MEDICINE

## 2021-03-18 PROCEDURE — 3074F PR MOST RECENT SYSTOLIC BLOOD PRESSURE < 130 MM HG: ICD-10-PCS | Mod: CPTII,S$GLB,, | Performed by: FAMILY MEDICINE

## 2021-03-18 PROCEDURE — 81003 URINALYSIS AUTO W/O SCOPE: CPT | Performed by: FAMILY MEDICINE

## 2021-03-18 PROCEDURE — 1126F AMNT PAIN NOTED NONE PRSNT: CPT | Mod: S$GLB,,, | Performed by: FAMILY MEDICINE

## 2021-03-18 PROCEDURE — 3079F PR MOST RECENT DIASTOLIC BLOOD PRESSURE 80-89 MM HG: ICD-10-PCS | Mod: CPTII,S$GLB,, | Performed by: FAMILY MEDICINE

## 2021-03-18 RX ORDER — LEVOTHYROXINE SODIUM 112 UG/1
112 TABLET ORAL DAILY
Qty: 90 TABLET | Refills: 3 | Status: SHIPPED | OUTPATIENT
Start: 2021-03-18 | End: 2022-03-18

## 2021-03-18 RX ORDER — CARVEDILOL 12.5 MG/1
12.5 TABLET ORAL 2 TIMES DAILY WITH MEALS
Qty: 180 TABLET | Refills: 3 | Status: SHIPPED | OUTPATIENT
Start: 2021-03-18 | End: 2022-03-18

## 2021-03-18 RX ORDER — SIMVASTATIN 20 MG/1
20 TABLET, FILM COATED ORAL NIGHTLY
Qty: 90 TABLET | Refills: 3 | Status: SHIPPED | OUTPATIENT
Start: 2021-03-18 | End: 2022-06-06

## 2021-03-19 LAB — HIV 1+2 AB+HIV1 P24 AG SERPL QL IA: NEGATIVE

## 2021-03-21 ENCOUNTER — PATIENT MESSAGE (OUTPATIENT)
Dept: INTERNAL MEDICINE | Facility: CLINIC | Age: 61
End: 2021-03-21

## 2021-03-24 ENCOUNTER — PATIENT OUTREACH (OUTPATIENT)
Dept: ADMINISTRATIVE | Facility: HOSPITAL | Age: 61
End: 2021-03-24

## 2021-12-08 ENCOUNTER — PATIENT MESSAGE (OUTPATIENT)
Dept: INTERNAL MEDICINE | Facility: CLINIC | Age: 61
End: 2021-12-08
Payer: COMMERCIAL

## 2022-01-15 ENCOUNTER — PATIENT MESSAGE (OUTPATIENT)
Dept: INTERNAL MEDICINE | Facility: CLINIC | Age: 62
End: 2022-01-15
Payer: COMMERCIAL

## 2022-04-27 ENCOUNTER — PATIENT MESSAGE (OUTPATIENT)
Dept: ADMINISTRATIVE | Facility: HOSPITAL | Age: 62
End: 2022-04-27
Payer: COMMERCIAL

## 2022-06-06 DIAGNOSIS — E78.2 MIXED HYPERLIPIDEMIA: ICD-10-CM

## 2022-06-06 RX ORDER — SIMVASTATIN 20 MG/1
20 TABLET, FILM COATED ORAL NIGHTLY
Qty: 90 TABLET | Refills: 0 | Status: SHIPPED | OUTPATIENT
Start: 2022-06-06 | End: 2022-09-01

## 2022-06-06 NOTE — TELEPHONE ENCOUNTER
No new care gaps identified.  [facility] Embedded Care Gaps. Reference number: [id number]. 6/06/2022   12:08:23 AM CDT

## 2022-06-06 NOTE — TELEPHONE ENCOUNTER
Refill Routing Note   Medication(s) are not appropriate for processing by [facility] for the following reason(s):      - Required laboratory values are outdated    [facility] action(s):  Defer          Medication reconciliation completed: No     Appointments  past 12m or future 3m with PCP    Date Provider   Last Visit   3/18/2021 Aquiles Espinoza MD   Next Visit   Visit date not found Aquiles Espinoza MD   ED visits in past 90 days: 0        Note composed:2:45 PM 06/06/2022

## 2022-07-27 ENCOUNTER — PATIENT MESSAGE (OUTPATIENT)
Dept: ADMINISTRATIVE | Facility: HOSPITAL | Age: 62
End: 2022-07-27
Payer: COMMERCIAL

## 2022-08-24 DIAGNOSIS — I10 ESSENTIAL HYPERTENSION: ICD-10-CM

## 2022-08-24 DIAGNOSIS — Z12.31 OTHER SCREENING MAMMOGRAM: ICD-10-CM

## 2022-09-01 DIAGNOSIS — E78.2 MIXED HYPERLIPIDEMIA: ICD-10-CM

## 2022-09-01 RX ORDER — SIMVASTATIN 20 MG/1
20 TABLET, FILM COATED ORAL NIGHTLY
Qty: 30 TABLET | Refills: 0 | Status: SHIPPED | OUTPATIENT
Start: 2022-09-01 | End: 2022-10-01

## 2022-10-20 ENCOUNTER — PATIENT MESSAGE (OUTPATIENT)
Dept: ADMINISTRATIVE | Facility: HOSPITAL | Age: 62
End: 2022-10-20
Payer: COMMERCIAL

## 2025-06-04 ENCOUNTER — HOSPITAL ENCOUNTER (OUTPATIENT)
Dept: RADIOLOGY | Facility: HOSPITAL | Age: 65
Discharge: HOME | End: 2025-06-04
Attending: PODIATRIST
Payer: COMMERCIAL

## 2025-06-04 ENCOUNTER — OFFICE VISIT (OUTPATIENT)
Dept: PODIATRY | Facility: CLINIC | Age: 65
End: 2025-06-04
Payer: COMMERCIAL

## 2025-06-04 VITALS — WEIGHT: 166 LBS | HEIGHT: 67 IN | BODY MASS INDEX: 26.06 KG/M2

## 2025-06-04 DIAGNOSIS — M20.11 HALLUX VALGUS OF RIGHT FOOT: Primary | ICD-10-CM

## 2025-06-04 DIAGNOSIS — M20.11 HALLUX VALGUS OF RIGHT FOOT: ICD-10-CM

## 2025-06-04 PROCEDURE — 73630 X-RAY EXAM OF FOOT: CPT | Mod: 26,RT,, | Performed by: RADIOLOGY

## 2025-06-04 PROCEDURE — 99999 PR PBB SHADOW E&M-EST. PATIENT-LVL III: CPT | Mod: PBBFAC,,, | Performed by: PODIATRIST

## 2025-06-04 PROCEDURE — 73630 X-RAY EXAM OF FOOT: CPT | Mod: TC,RT

## 2025-06-04 PROCEDURE — 3008F BODY MASS INDEX DOCD: CPT | Mod: CPTII,S$GLB,, | Performed by: PODIATRIST

## 2025-06-04 PROCEDURE — 1160F RVW MEDS BY RX/DR IN RCRD: CPT | Mod: CPTII,S$GLB,, | Performed by: PODIATRIST

## 2025-06-04 PROCEDURE — 1159F MED LIST DOCD IN RCRD: CPT | Mod: CPTII,S$GLB,, | Performed by: PODIATRIST

## 2025-06-04 PROCEDURE — 99203 OFFICE O/P NEW LOW 30 MIN: CPT | Mod: S$GLB,,, | Performed by: PODIATRIST

## 2025-06-04 RX ORDER — ROSUVASTATIN CALCIUM 40 MG/1
10 TABLET, COATED ORAL NIGHTLY
COMMUNITY

## 2025-06-04 RX ORDER — ASPIRIN 81 MG/1
81 TABLET ORAL DAILY
COMMUNITY

## 2025-06-04 NOTE — PROGRESS NOTES
Subjective:     Patient ID: Gaviota Frazier is a 65 y.o. female.    Chief Complaint: Foot Pain (RLE 1st metatarsal head. Non diabetic. 10/10 pain. Pt wears flip flops no socks. Last PCP appt with Dr Mariah Ferrell was 2024.)    Gaviota is a 65 y.o. female who presents to the podiatry clinic  with complaint of  right foot pain. Onset of the symptoms was several months ago. Precipitating event: none known. Current symptoms include: ability to bear weight, but with some pain and stiffness. Aggravating factors: any weight bearing. Symptoms have gradually worsened. Patient has had prior foot problems. Treatment to date: corticosteroid injection which was not very effective, rest, and changed shoes and wore toe splints. Patients rates pain 10/10 on pain scale. Patient points to right 1st MPJ. Patient has no other pedal complaints at this time.     Problem List[1]    Medication List with Changes/Refills   New Medications    GABAPENTIN (NEURONTIN) 100 MG CAPSULE    Take 1 capsule (100 mg total) by mouth every evening.   Current Medications    ASPIRIN (ECOTRIN) 81 MG EC TABLET    Take 81 mg by mouth once daily.    CARVEDILOL (COREG) 12.5 MG TABLET    Take 1 tablet (12.5 mg total) by mouth 2 (two) times daily with meals.    ESTRADIOL (ESTRACE) 2 MG TABLET    Take 1 tablet (2 mg total) by mouth once daily.    LEVOTHYROXINE (SYNTHROID) 112 MCG TABLET    Take 1 tablet (112 mcg total) by mouth once daily.    ROSUVASTATIN (CRESTOR) 40 MG TAB    Take 10 mg by mouth every evening.   Discontinued Medications    SIMVASTATIN (ZOCOR) 20 MG TABLET    Take 1 tablet (20 mg total) by mouth every evening.       Review of patient's allergies indicates:   Allergen Reactions    Squash Hives and Itching    [place] Swelling     To face    Unable to assess Nausea And Vomiting     SQUASH FOOD ALLERGY       Past Surgical History:   Procedure Laterality Date     SECTION      COLONOSCOPY N/A 2017    Procedure: COLONOSCOPY;  Surgeon:  "Francisco Juarez MD;  Location: [facility];  Service: Endoscopy;  Laterality: N/A;    TOTAL ABDOMINAL HYSTERECTOMY      benign    TOTAL THYROIDECTOMY  7/23/2014       Family History   Problem Relation Name Age of Onset    Hypertension Mother      Heart disease Mother  75        CABG    Hypertension Father         Social History[2]    Vitals:    06/04/25 1527   Weight: 75.3 kg (166 lb 0.1 oz)   Height: 5' 7" (1.702 m)   PainSc: 10-Worst pain ever       No results found for: "HGBA1C"    Review of Systems   Constitutional:  Negative for chills and fever.   Respiratory:  Negative for shortness of breath.    Cardiovascular:  Negative for chest pain, palpitations, orthopnea, claudication and leg swelling.   Gastrointestinal:  Negative for diarrhea, nausea and vomiting.   Musculoskeletal:  Positive for joint pain (right 1st MPJ).   Skin:  Negative for rash.   Neurological:  Negative for dizziness, tingling, sensory change, focal weakness and weakness.   Psychiatric/Behavioral: Negative.           Objective:      PHYSICAL EXAM: Apperance: Alert and orient in no distress,well developed, and with good attention to grooming and body habits  Patient presents ambulating in flip flops  LOWER EXTREMITIES EXAM:   VASCULAR: Dorsalis pedis pulses 2/4 right and Posterior Tibial pulses 2/4 right.   NEUROLOGICAL: Light touch, sharp-dull, proprioception all present and equal bilaterally.  MUSCULOSKELETAL: Muscle strength is 5/5 for foot inverters, everters, plantarflexors, and dorsiflexors. Muscle tone is normal. Positive pain on palpation of right 1st MPJ.   Right 1st MTPJ ROM shows decreased extension. Right 1st metatarsophalangeal joint exhibits moderate medial prominence with pain to palpation, no evidence of bursitis or neuritis noted. right 1st ray ROM demonstrates equal amounts of dorsiflexion and plantarflexion.          Assessment:       ICD-10-CM ICD-9-CM   1. Hallux valgus of right foot - Right Foot  M20.11 735.0       Plan: "   Hallux valgus of right foot - Right Foot  -     X-Ray Foot Complete Right; Future; Expected date: 06/04/2025  -     Sedimentation rate; Future; Expected date: 06/04/2025  -     C-Reactive Protein; Future; Expected date: 06/04/2025  -     Uric Acid; Future; Expected date: 06/04/2025      I counseled the patient on her conditions, regarding findings of my examination, my impressions, and usual treatment plan.   Discussed surgical and conservative management of HAV/arthritic joint deformity. Conservatively we did discuss padding, and shoe modifications such as softer shoes with wide toe boxes. Surgically we briefly discussed pre and post operative expectations. The patient elects for surgical management at this time   I explained to the patient that etiologies and treatment options for gout including rest, elevation, diet control, NSAID's, long term gout medication, and/or injection therapy.    Ordered right foot x-rays, uric acid, crp, and esr testing to be completed today.    Patient to return scheduled with Dr. Lam for surgical intervention.                 Roberta Lyles DPM  [facility]          [1]   Patient Active Problem List  Diagnosis    Essential hypertension    Gastroesophageal reflux disease without esophagitis    Acquired hypothyroidism    Antinuclear factor positive    Mixed hyperlipidemia    Menopausal syndrome (hot flashes)   [2]   Social History  Socioeconomic History    Marital status: Single    Number of children: 2   Occupational History    Occupation: [employer]      Employer: [employer]   Tobacco Use    Smoking status: Never    Smokeless tobacco: Never   Substance and Sexual Activity    Alcohol use: No    Drug use: No    Sexual activity: Yes   Social History Narrative    She is a  for the [employer].      Social Drivers of Health     Financial Resource Strain: Low Risk  (9/3/2023)    Received from [facility] and Its Subsidiaries and Affiliates    Overall Financial Resource Strain (CARDIA)     Difficulty of Paying Living Expenses: Not hard at all   Food Insecurity: No Food Insecurity (9/3/2023)    Received from [facility] and Its Subsidiaries and Affiliates    Hunger Vital Sign     Worried About Running Out of Food in the Last Year: Never true     Ran Out of Food in the Last Year: Never true   Transportation Needs: No Transportation Needs (9/3/2023)    [facility] from [facility] and Its Subsidiaries and Affiliates    PRAPARE - Transportation     Lack of Transportation (Medical): No     Lack of Transportation (Non-Medical): No   Physical Activity: Inactive (9/3/2023)    Received from [facility] and Its Subsidiaries and Affiliates    Exercise Vital Sign     Days of Exercise per Week: 0 days     Minutes of Exercise per Session: 0 min   Stress: Stress Concern Present (9/3/2023)    Received from [facility] and Its Subsidiaries and Affiliates    [place] [employer] - Occupational Stress Questionnaire     Feeling of Stress : To some extent   Housing Stability: Low Risk  (9/3/2023)    Received from [facility] and Its Subsidiaries and Affiliates    Housing Stability Vital Sign     Unable to Pay for Housing in the Last Year: No     Number of Places Lived in the Last Year: 1     Unstable Housing in the Last Year: No

## 2025-06-11 ENCOUNTER — OFFICE VISIT (OUTPATIENT)
Dept: PODIATRY | Facility: CLINIC | Age: 65
End: 2025-06-11
Payer: COMMERCIAL

## 2025-06-11 VITALS — BODY MASS INDEX: 26.06 KG/M2 | HEIGHT: 67 IN | WEIGHT: 166 LBS

## 2025-06-11 DIAGNOSIS — M20.11 HALLUX VALGUS OF RIGHT FOOT: Primary | ICD-10-CM

## 2025-06-11 DIAGNOSIS — I77.9 CAROTID ARTERY DISEASE, UNSPECIFIED LATERALITY, UNSPECIFIED TYPE: ICD-10-CM

## 2025-06-11 DIAGNOSIS — M77.51 BURSITIS OF RIGHT FOOT: ICD-10-CM

## 2025-06-11 DIAGNOSIS — G57.91 PERIPHERAL NEURITIS OF RIGHT FOOT: ICD-10-CM

## 2025-06-11 DIAGNOSIS — M79.674 PAIN OF RIGHT GREAT TOE: ICD-10-CM

## 2025-06-11 PROCEDURE — 99999 PR PBB SHADOW E&M-EST. PATIENT-LVL III: CPT | Mod: PBBFAC,,, | Performed by: PODIATRIST

## 2025-06-11 PROCEDURE — 1159F MED LIST DOCD IN RCRD: CPT | Mod: CPTII,S$GLB,, | Performed by: PODIATRIST

## 2025-06-11 PROCEDURE — 99214 OFFICE O/P EST MOD 30 MIN: CPT | Mod: S$GLB,,, | Performed by: PODIATRIST

## 2025-06-11 PROCEDURE — 3008F BODY MASS INDEX DOCD: CPT | Mod: CPTII,S$GLB,, | Performed by: PODIATRIST

## 2025-06-11 PROCEDURE — 1160F RVW MEDS BY RX/DR IN RCRD: CPT | Mod: CPTII,S$GLB,, | Performed by: PODIATRIST

## 2025-06-11 RX ORDER — GABAPENTIN 100 MG/1
100 CAPSULE ORAL NIGHTLY
Qty: 30 CAPSULE | Refills: 0 | Status: SHIPPED | OUTPATIENT
Start: 2025-06-11 | End: 2025-07-11

## 2025-06-11 NOTE — PROGRESS NOTES
Subjective:       Patient ID: Gaviota Frazier is a 65 y.o. female.    Chief Complaint: Bunions (C/o right bunion surgical consult, pt states she been dealing with the pain for a long time but it got to a point where its too much, pt rates pain 10/10, pt is non-diabetic)      HPI: Gaviota Frazier presents to the office today with complaints of moderate pains to the right foot at the great toe due to bunion deformity. Patient states pains are recalcitrant to Tylenol and/or NSAID therapy and wider width shoe gear. Patient has had injection therapy to the 1st MTPJ on the affected limb prior. Patient has had discomfort to the great toe for the past several months. Patient is indeed interested in surgical intervention and/or cure for alleviation of symptoms. Has failed HAV splints. Patient's Primary Care Provider is Sergey Ferrell MD. Cardiologist is Diaz Doyle MD, [facility] (states a 60%sharon blockage in left carotid).     Review of patient's allergies indicates:   Allergen Reactions    Squash Hives and Itching    [place]ittrick Swelling     To face    Unable to assess Nausea And Vomiting     SQUASH FOOD ALLERGY       Past Medical History:   Diagnosis Date    GERD (gastroesophageal reflux disease)     HTN (hypertension)     Hypercholesteremia     Hypothyroidism (acquired) 2014    Nontoxic nodular goiter 2014       Family History   Problem Relation Name Age of Onset    Hypertension Mother      Heart disease Mother  75        CABG    Hypertension Father         Social History[1]    Past Surgical History:   Procedure Laterality Date     SECTION      COLONOSCOPY N/A 2017    Procedure: COLONOSCOPY;  Surgeon: rFancisco Juarez MD;  Location: [facility];  Service: Endoscopy;  Laterality: N/A;    TOTAL ABDOMINAL HYSTERECTOMY      benign    TOTAL THYROIDECTOMY  2014       Review of Systems   Constitutional:  Negative for chills, fatigue and fever.   HENT:  Negative for hearing loss.    Eyes:  Negative for photophobia  "and visual disturbance.   Respiratory:  Negative for cough, chest tightness, shortness of breath and wheezing.    Cardiovascular:  Negative for chest pain and palpitations.   Gastrointestinal:  Negative for constipation, diarrhea, nausea and vomiting.   Endocrine: Negative for cold intolerance and heat intolerance.   Genitourinary:  Negative for flank pain.   Musculoskeletal:  Positive for gait problem. Negative for neck pain and neck stiffness.   Neurological:  Negative for light-headedness and headaches.   Psychiatric/Behavioral:  Negative for sleep disturbance.        Objective:   Ht 5' 7" (1.702 m)   Wt 75.3 kg (166 lb 0.1 oz)   BMI 26.00 kg/m²     X-Ray Foot Complete Right  Narrative: EXAMINATION:  XR FOOT COMPLETE 3 VIEW RIGHT    CLINICAL HISTORY:  . Hallux valgus (acquired), right foot    TECHNIQUE:  AP, lateral, and oblique views of the foot were performed.    COMPARISON:  None    FINDINGS:  There is no evidence to suggest acute fracture or dislocation.  A mild hallux valgus deformity with very mild degenerative changes seen at the 1st MTP joint.  Impression: As above    Electronically signed by: Juan C Malave DO  Date:    06/04/2025  Time:    16:22      Physical Exam  LOWER EXTREMITY PHYSICAL EXAMINATION    VASCULAR: On the right foot, the dorsalis pedis pulse is 2/4 and the posterior tibial pulse is 2/4. Capillary refill time is less than 3 seconds. Hair growth is present on the dorsum of the foot and at the digits. Proximal to distal temperature is warm to warm    ORTHOPEDIC: Moderate bunion deformity is noted to the right foot. Upon ROM in the sagittal plan, there is moderate pain related at the end ROM, dorsally; no plantar pains at the 1st MTPJ are stated. No pains to palpation of the tibial or the fibular sesamoid. There is a large medial eminence appreciated. There is minimal dorsal spurring noted. Palpation of the dorsal-lateral aspect of the 1st MTPJ is painful. Hallux abductus is noted.  Hallux " interphalangeus is not noted. There is tracking. The hallux is trackbound. There is minimal hypermobility noted at the 1st TMTJ. Upon reduction of the IM angle at the 1st metatarsal head, the hallux is not rectus.     Assessment:     1. Hallux valgus of right foot    2. Bursitis of right foot    3. Pain of right great toe    4. Carotid artery disease, unspecified laterality, unspecified type    5. Peripheral neuritis of right foot          Plan:     Hallux valgus of right foot    Bursitis of right foot    Pain of right great toe  -     gabapentin (NEURONTIN) 100 MG capsule; Take 1 capsule (100 mg total) by mouth every evening.  Dispense: 30 capsule; Refill: 0    Carotid artery disease, unspecified laterality, unspecified type    Peripheral neuritis of right foot  -     gabapentin (NEURONTIN) 100 MG capsule; Take 1 capsule (100 mg total) by mouth every evening.  Dispense: 30 capsule; Refill: 0      Thorough discussion is had with the patient today, concerning the diagnosis, its etiology, and the treatment algorithm at present.     XRAYS are reviewed in detail with the patient. All questions and concerns regarding findings and its/their implications are outlined and discussed.    XRAY shows large IM angle of approximately 21 degrees.    Given the age of the patient and xray findings, I do recommend 1st MTPJ fusion, but patient is hesitant and prefers 1st TMTJ fusion.    The procedure of (RLE 1st TMTJ fusion) was thoroughly explained to the patient. Its necessity and/or purpose and the implications therein were outlined, including any pertinent advantages and/or disadvantages, and possible complications, if any. Possible complications include recurrence of pathology and/or deformity, infection (cellulitis, drainage, purulence, malodor, etc...), pain, numbness, neuritis, edema, burning, loss of function, need for further surgery, possible need for removal of any implanted hardware, soft tissue contracture and/or  scarring, etc... No guarantees were given and/or implied. Post-operative expectations and weightbearing protocol is thoroughly explained to the patient, who acknowledges understanding.     Will see Dr. Diaz Doyle MD, [facility] on 6/26/2025.     Patient will send me a MyChart message after discussing with Cardiology.          No future appointments.         [1]   Social History  Socioeconomic History    Marital status: Single    Number of children: 2   Occupational History    Occupation: [place] [employer] [place]     Employer: [employer]   Tobacco Use    Smoking status: Never    Smokeless tobacco: Never   Substance and Sexual Activity    Alcohol use: No    Drug use: No    Sexual activity: Yes   Social History Narrative    She is a  for the [employer].      Social Drivers of Health     Financial Resource Strain: Low Risk  (9/3/2023)    [facility]    Overall Financial Resource Strain (CARDIA)     Difficulty of Paying Living Expenses: Not hard at all   Food Insecurity: No Food Insecurity (9/3/2023)    Received from [facility] Subsidi[facility] and Affiliates    Hunger Vital Sign     Worried About Running Out of Food in the Last Year: Never true     Ran Out of Food in the Last Year: Never true   Transportation Needs: No Transportation Needs (9/3/2023)    Received from [facility] Its Subsidiaries and Affiliates    PRAPARE - Transportation     Lack of Transportation (Medical): No     Lack of Transportation (Non-Medical): No   Physical Activity: Inactive (9/3/2023)    Received from [facility] and Its Subsidiaries and Affiliates    Exercise Vital Sign     Days of Exercise per Week: 0 days     Minutes of Exercise per Session: 0 min   Stress: Stress Concern Present (9/3/2023)    Received from  [facility] Its Subsidiaries and Affiliates    [employer] - Occupational Stress Questionnaire     Feeling of Stress : To some extent   Housing Stability: Low Risk  (9/3/2023)    Received from [facility] Its Subsidiaries and Affiliates    Housing Stability Vital Sign     Unable to Pay for Housing in the Last Year: No     Number of Places Lived in the Last Year: 1     Unstable Housing in the Last Year: No

## 2025-07-15 DIAGNOSIS — M79.674 PAIN OF RIGHT GREAT TOE: ICD-10-CM

## 2025-07-15 DIAGNOSIS — G57.91 PERIPHERAL NEURITIS OF RIGHT FOOT: ICD-10-CM

## 2025-07-15 RX ORDER — GABAPENTIN 100 MG/1
100 CAPSULE ORAL NIGHTLY
Qty: 30 CAPSULE | Refills: 0 | Status: SHIPPED | OUTPATIENT
Start: 2025-07-15